# Patient Record
Sex: MALE | Employment: OTHER | URBAN - METROPOLITAN AREA
[De-identification: names, ages, dates, MRNs, and addresses within clinical notes are randomized per-mention and may not be internally consistent; named-entity substitution may affect disease eponyms.]

---

## 2017-01-10 ENCOUNTER — ALLSCRIPTS OFFICE VISIT (OUTPATIENT)
Dept: OTHER | Facility: OTHER | Age: 62
End: 2017-01-10

## 2017-01-31 ENCOUNTER — ALLSCRIPTS OFFICE VISIT (OUTPATIENT)
Dept: OTHER | Facility: OTHER | Age: 62
End: 2017-01-31

## 2017-01-31 LAB
FLUAV AG SPEC QL IA: POSITIVE
INFLUENZA B AG (HISTORICAL): NEGATIVE

## 2017-02-08 ENCOUNTER — ALLSCRIPTS OFFICE VISIT (OUTPATIENT)
Dept: OTHER | Facility: OTHER | Age: 62
End: 2017-02-08

## 2017-02-16 ENCOUNTER — GENERIC CONVERSION - ENCOUNTER (OUTPATIENT)
Dept: OTHER | Facility: OTHER | Age: 62
End: 2017-02-16

## 2017-05-11 ENCOUNTER — GENERIC CONVERSION - ENCOUNTER (OUTPATIENT)
Dept: OTHER | Facility: OTHER | Age: 62
End: 2017-05-11

## 2017-11-21 ENCOUNTER — GENERIC CONVERSION - ENCOUNTER (OUTPATIENT)
Dept: OTHER | Facility: OTHER | Age: 62
End: 2017-11-21

## 2018-01-09 ENCOUNTER — ALLSCRIPTS OFFICE VISIT (OUTPATIENT)
Dept: OTHER | Facility: OTHER | Age: 63
End: 2018-01-09

## 2018-01-09 DIAGNOSIS — E11.9 TYPE 2 DIABETES MELLITUS WITHOUT COMPLICATIONS (HCC): ICD-10-CM

## 2018-01-09 DIAGNOSIS — Z00.00 ENCOUNTER FOR GENERAL ADULT MEDICAL EXAMINATION WITHOUT ABNORMAL FINDINGS: ICD-10-CM

## 2018-01-09 DIAGNOSIS — I10 ESSENTIAL (PRIMARY) HYPERTENSION: ICD-10-CM

## 2018-01-09 DIAGNOSIS — Z12.5 ENCOUNTER FOR SCREENING FOR MALIGNANT NEOPLASM OF PROSTATE: ICD-10-CM

## 2018-01-12 NOTE — RESULT NOTES
Verified Results  (1) HEMOGLOBIN A1C [id number] 07:11AM Jerry Nuon   5 7-6 4% impaired fasting glucose  >=6 5% diagnosis of diabetes    Falsely low levels are seen in conditions linked to short RBC life span-  hemolytic anemia, and splenomegaly  Falsely elevated levels are seen in situations where there is an increased production of RBC- receipt of erythropoietin or blood transfusions  Adopted from ADA-Clinical Practice Recommendations     Test Name Result Flag Reference   HEMOGLOBIN A1C 10 6 % H 4 0-5 6   EST  AVG  GLUCOSE 258 mg/dl       (1) VITAMIN D 25-HYDROXY [id number] 07:11AM Jerry Nuno     Test Name Result Flag Reference   VIT D 25-HYDROX 28 3 ng/mL L 30 0-100 0     (1) LIPID PANEL FASTING W DIRECT LDL REFLEX [id number] 07:11AM Jerry Nuno   Triglyceride:         Normal              <150 mg/dl       Borderline High    150-199 mg/dl       High               200-499 mg/dl       Very High          >499 mg/dl  Cholesterol:         Desirable        <200 mg/dl      Borderline High  200-239 mg/dl      High             >239 mg/dl  HDL Cholesterol:        High    >59 mg/dL      Low     <41 mg/dL  LDL Cholesterol:        Optimal          <100 mg/dl         Near Optimal     100-129 mg/dl        Above Optimal          Borderline High   130-159 mg/dl          High              160-189 mg/dl          Very High        >189 mg/dl  LDL CALCULATED:    This screening LDL is a calculated result  It does not have the accuracy of the Direct Measured LDL in the monitoring of patients with hyperlipidemia and/or statin therapy  Direct Measure LDL (BSR985) must be ordered separately in these patients  Test Name Result Flag Reference   CHOLESTEROL 140 mg/dL     LDL CHOLESTEROL CALCULATED 73 mg/dL  0-100   TRIGLYCERIDES 102 mg/dL  <=150   Specimen collection should occur prior to N-Acetylcysteine or Metamizole administration due to the potential for falsely depressed results     HDL,DIRECT 47 mg/dL  40-60     (1) COMPREHENSIVE METABOLIC PANEL [id number] 64:76ZQ Griseldai, [address] [facility] Kidney Disease Education Program recommendations are as follows:  GFR calculation is accurate only with a steady state creatinine  Chronic Kidney disease less than 60 ml/min/1 73 sq  meters  Kidney failure less than 15 ml/min/1 73 sq  meters  Test Name Result Flag Reference   GLUCOSE,RANDM 171 mg/dL H    SODIUM 139 mmol/L  136-145   POTASSIUM 4 5 mmol/L  3 5-5 3   CHLORIDE 105 mmol/L  100-108   CARBON DIOXIDE 30 mmol/L  21-32   ANION GAP (CALC) 4 mmol/L  4-13   BLOOD UREA NITROGEN 16 mg/dL  5-25   CREATININE 0 88 mg/dL  0 60-1 30   Standardized to IDMS reference method   CALCIUM 8 8 mg/dL  8 3-10 1   BILI, TOTAL 0 54 mg/dL  0 20-1 00   ALK PHOSPHATAS 79 U/L     ALT (SGPT) 43 U/L  12-78   AST(SGOT) 18 U/L  5-45   ALBUMIN 2 9 g/dL L 3 5-5 0   TOTAL PROTEIN 7 4 g/dL  6 4-8 2   eGFR Non-African American      >60 0 ml/min/1 73sq m     (1) TSH WITH FT4 REFLEX [id number] 07:11AM Shyanne Mendoza   Patients undergoing fluorescein dye angiography may retain small amounts of fluorescein in the body for 48-72 hours post procedure  Samples containing fluorescein can produce falsely depressed TSH values  If the patient had this procedure,a specimen should be resubmitted post fluorescein clearance  Test Name Result Flag Reference   TSH 3 950 uIU/mL H 0 358-3 740     (1) TSH [id number] 07:11AM Daniele Hard   Patients undergoing fluorescein dye angiography may retain small amounts of fluorescein in the body for 48-72 hours post procedure  Samples containing fluorescein can produce falsely depressed TSH values  If the patient had this procedure,a specimen should be resubmitted post fluorescein clearance       Test Name Result Flag Reference   TSH 3 950 uIU/mL H 0 358-3 740     (1) TSH WITH FT4 REFLEX [id number] 07:11AM Daniele Hard     Test Name Result Flag Reference   T4,FREE 1 04 ng/dL  0 76-1 46     (1) MICROALBUMIN CREATININE RATIO, RANDOM URINE [id number] 07:11AM Daniele Hard     Test Name Result Flag Reference   MICROALBUMIN/ CREAT R 219 mg/g creatinine H 0-30   MICROALBUMIN,URINE 313 0 mg/L H 0 0-20 0   CREATININE URINE 143 0 mg/dL         Discussion/Summary   DIABETES STILL VERY OUT OF CONTROL  YOU NEED TO CONTACT YOUR ENDOCRINOLOGIST     DR IRIZARRY [facility]

## 2018-01-13 VITALS
WEIGHT: 280 LBS | BODY MASS INDEX: 40.18 KG/M2 | DIASTOLIC BLOOD PRESSURE: 90 MMHG | SYSTOLIC BLOOD PRESSURE: 114 MMHG | HEART RATE: 84 BPM | RESPIRATION RATE: 16 BRPM | TEMPERATURE: 99.2 F

## 2018-01-13 VITALS
BODY MASS INDEX: 40.23 KG/M2 | DIASTOLIC BLOOD PRESSURE: 78 MMHG | RESPIRATION RATE: 16 BRPM | HEART RATE: 68 BPM | TEMPERATURE: 98.6 F | SYSTOLIC BLOOD PRESSURE: 120 MMHG | WEIGHT: 280.38 LBS

## 2018-01-14 VITALS
SYSTOLIC BLOOD PRESSURE: 124 MMHG | DIASTOLIC BLOOD PRESSURE: 84 MMHG | HEART RATE: 68 BPM | OXYGEN SATURATION: 98 % | TEMPERATURE: 97.9 F | RESPIRATION RATE: 16 BRPM

## 2018-01-23 VITALS
SYSTOLIC BLOOD PRESSURE: 122 MMHG | HEART RATE: 73 BPM | DIASTOLIC BLOOD PRESSURE: 76 MMHG | RESPIRATION RATE: 18 BRPM | HEIGHT: 70 IN | TEMPERATURE: 98.1 F | WEIGHT: 288 LBS | BODY MASS INDEX: 41.23 KG/M2

## 2018-01-23 NOTE — PROGRESS NOTES
Assessment    1  Encounter for preventive health examination (V70 0) (Z00 00)   2  Diabetes mellitus type 2, uncomplicated (307 57) (O51 1)   3  Hypertension (401 9) (I10)   4  History of Prostate cancer screening (V76 44) (Z12 5)   5  Prostate cancer screening (V76 44) (Z12 5)    Plan  Diabetes mellitus type 2, uncomplicated, Health Maintenance, Hypertension, Prostate  cancer screening    · (1) PSA (SCREEN) (Dx V76 44 Screen for Prostate Cancer); Status:Active; Requested  CFL:93HFD4576;    · (1) QUANTIFERON - TB GOLD; Status:Active; Requested KGI:97PSN7028;    · Follow-up visit in 1 year Evaluation and Treatment  Follow-up  Status: Complete  Done:  36FJO5972   · Pneumovax 23 25 MCG/0 5ML Injection Injectable; INJECT 0 5  ML  Intramuscular; To Be Done: [id number]  Need for pneumococcal vaccination    · Pneumovax 23 25 MCG/0 5ML Injection Injectable  Neurodermatitis    · Clobetasol Propionate 0 05 % External Ointment; APPLY SPARINGLY TO  AFFECTED AREA(S) ONCE DAILY    Discussion/Summary  health maintenance visit Impression: healthy adult male  Currently, he eats a healthy diet and has an adequate exercise regimen  Prostate cancer screening: the risks and benefits of prostate cancer screening were discussed, PSA was ordered and Pt declines VALERIANO / prostatic exam today  Colorectal cancer screening: the risks and benefits of colorectal cancer screening were discussed, colorectal cancer screening is current and colorectal cancer screening is managed by GI  Screening lab work includes PSA and Quantiferon Gold testing ordered  The risks and benefits of immunizations were discussed, immunizations are up to date and Pneumovax given IM today, and tolerated well  Advice and education were given regarding nutrition, aerobic exercise and Forms signed off for the pt today  He is to continue f/u with Endocrinology  Patient discussion: discussed with the patient, Laverne Mandujano is to f/u in 1 year, or sooner PRN        Chief Complaint  PT here for physical  No complaints or pain  Has paperwork that needs to be filled out  History of Present Illness  HM, Adult Male: The patient is being seen for a health maintenance evaluation  The last health maintenance visit was 1 year(s) ago  General Health: The patient's health since the last visit is described as good   Seeing his Endocrinologist regularly  Last A1c reported to be 11%  Back on his diet  He is walking for exercise , Recent trip [facility] -> feeling better  Eye exam is UTD  Colonoscopy is UTD  He has regular dental visits  He denies vision problems  He denies hearing loss  Immunizations status: up to date  Lifestyle:  He consumes a diverse and healthy diet  He has weight concerns  He exercises regularly  He does not use tobacco  The patient is a former cigarette smoker  He denies alcohol use  He denies drug use  Reproductive health:  the patient is sexually active  He complains of erectile dysfunction  Screening: cancer screening reviewed and current  metabolic screening reviewed and current  risk screening reviewed and current  Additional History:  No CP/SOB  No abd pain or rectal bleeding  Urine flow is fine  No ED  No anxiety or depression  HPI: As above  Review of Systems    Constitutional: as noted in HPI  Cardiovascular: as noted in HPI  Respiratory: as noted in HPI  Gastrointestinal: as noted in HPI  Genitourinary: as noted in HPI  Psychiatric: as noted in HPI  Endocrine: as noted in HPI  Active Problems    1  Acute bronchitis (466 0) (J20 9)   2  Chronic low back pain (724 2,338 29) (M54 5,G89 29)   3  Cough (786 2) (R05)   4  Diabetes mellitus type 2, uncomplicated (602 69) (C15 8)   5  Fever (780 60) (R50 9)   6  Hypertension (401 9) (I10)   7  Influenza A (487 1) (J10 1)   8  Low vitamin D level (268 9) (E55 9)   9  Mitral valve disorder (424 0) (I05 9)   10   Morbid or severe obesity due to excess calories (278 01) (E66 01) 11  Neck pain (723 1) (M54 2)   12  Need for prophylactic vaccination and inoculation against influenza (V04 81) (Z23)   13  History of Need for prophylactic vaccination and inoculation against influenza (V04 81)    (Z23)   14  Neurodermatitis (698 3) (L28 0)   15  Nocturia (788 43) (R35 1)   16  Organic impotence (607 84) (N52 9)   17  Screening for colorectal cancer (V76 51) (Z12 11,Z12 12)   18  Well adult on routine health check (V70 0) (Z00 00)    Past Medical History    · History of Groin (Inguinal) Pain (789 09)   · History of Need for prophylactic vaccination and inoculation against influenza (V04 81)  (Z23)   · History of Prostate cancer screening (V76 44) (Z12 5)    Surgical History    · History Of Prior Surgery    Family History  Mother    · Family history of diabetes mellitus (V18 0) (Z83 3)   · Family history of hypertension (V17 49) (Z82 49)  Father    · Family history of diabetes mellitus (V18 0) (Z83 3)   · Family history of hypertension (V17 49) (Z82 49)    Social History    · Former smoker (V15 82) (Q53 103)   · Social alcohol use (Z78 9)    Current Meds   1  Accu-Chek Celine Plus In Vitro Strip; TEST 3 TIMES DAILY; Therapy: 94LHB6833 to (Evaluate:21Jan2018)  Requested for: 98ELH6920; Last   Rx:81Atj2045 Ordered   2  Accu-Chek Celine Plus w/Device Kit; USE AS DIRECTED; Therapy: 83KLO6754 to (Last Rx:10Sga5172)  Requested for: 85UVI2210 Ordered   3  Ammonium Lactate 12 % External Lotion; Therapy: 89XQV5865 to Recorded   4  Apidra SoloStar 100 UNIT/ML Subcutaneous Solution Pen-injector; Therapy: 15WJI7493 to Recorded   5  Azithromycin 250 MG Oral Tablet; TAKE 2 TABLETS ON DAY 1 THEN TAKE 1 TABLET A   DAY FOR 4 DAYS; Therapy: 39FDW6946 to (Evaluate:15Jan2017)  Requested for: 88LNP1888; Last   Rx:10Jan2017 Ordered   6  BD Pen Needle Nafisa U/F 32G X 4 MM Miscellaneous; Therapy: 53RPE2191 to Recorded   7  Cialis 20 MG Oral Tablet; Take as directed;    Therapy: 15Apr2015 to (Evaluate:97Rrb3511) Requested for: 09LGR1025; Last   Rx:71Iye4248 Ordered   8  Clobetasol Propionate 0 05 % External Ointment; Therapy: 08WLB5990 to (Evaluate:29Apr2015) Recorded   9  Clotrimazole 1 % External Cream;   Therapy: 48WDP4034 to (Evaluate:25Apr2015) Recorded   10  FreeStyle Freedom Lite w/Device Kit; USE AS DIRECTED; Therapy: 08REC7210 to (Last Rx:01Jbb3978) Ordered   11  FreeStyle Lancets Miscellaneous; USE TO TEST BLOOD SUGAR TWICE DAILY; Therapy: 65Xwm7112 to (Ani Diesel)  Requested for: 75ROU4866; Last    Rx:06Lmm3377 Ordered   12  FreeStyle Lite Test In Vitro Strip; USE TO TEST BLOOD SUGAR TWICE DAILY; Therapy: 39PBF4298 to (Evaluate:25Mar2017)  Requested for: 06QZD4438; Last    Rx:70Vyu9474 Ordered   13  Insulin Syringe 30G X 5/16" 1 ML Miscellaneous; Therapy: 23XQG2460 to (Last Rx:64Rwi9147)  Requested for: 74Dzf4984 Ordered   14  Januvia 100 MG Oral Tablet Recorded   15  Lantus 100 UNIT/ML Subcutaneous Solution; Therapy: 39TKG9237 to (Last Rx:34Gtc5736)  Requested for: 63FWL5364 Ordered   16  Lantus SoloStar 100 UNIT/ML Subcutaneous Solution Pen-injector; Therapy: 11JZY2866 to Recorded   17  Levitra 20 MG Oral Tablet; TAKE ONE TABLET BY MOUTH AS NEEDED; Therapy: 09XPA4895 to (Evaluate:09Sfm5984)  Requested for: 94UML0236; Last    Rx:27Slf0542 Ordered   25  Losartan Potassium 50 MG Oral Tablet; take one tablet by mouth daily as directed; Therapy: 86KSA0360 to (Evaluate:05Jan2018)  Requested for: 23Lsd0790; Last    Rx:57Eti8727 Ordered   19  Lovastatin 40 MG Oral Tablet; Therapy: 56OVX0476 to Recorded   20  NovoFine 32G X 6 MM Miscellaneous; USE AS DIRECTED; Therapy: 72YIO2061 to (Last Rx:59Jhp1710)  Requested for: 57Sud0120 Ordered   21  Nystatin 451231 UNIT/GM External Cream;    Therapy: 49LMN1280 to (Evaluate:22Apr2015) Recorded   22  Qvar 80 MCG/ACT Inhalation Aerosol Solution; INHALE 1 PUFF BY MOUTH TWO TIMES    DAILY; Therapy: 92NRJ4832 to (Evaluate:27Nov2017);  Last AM:91GFS4793 Ordered   23  Ventolin  (90 Base) MCG/ACT Inhalation Aerosol Solution; INHALE 1 TO 2    PUFFS EVERY 4 TO 6 HOURS AS NEEDED; Therapy: 30PRA2366 to (Last Rx:10Jan2017)  Requested for: 08SCQ4895 Ordered   24  Vitamin D (Ergocalciferol) 49939 UNIT Oral Capsule; Therapy: 68YUN6022 to Recorded    Allergies    1  No Known Drug Allergies    2  Dust    Vitals   Recorded: 15NAL9568 02:42PM   Temperature 98 1 F   Heart Rate 73   Respiration 18   Systolic 829   Diastolic 76   Height 5 ft 10 31 in   Weight 288 lb    BMI Calculated 40 96   BSA Calculated 2 45   Pain Scale 0     Physical Exam    Constitutional   General appearance: No acute distress, well appearing and well nourished  NAD; VSS; pleasant  Head and Face   Head and face: Normal     Eyes   Conjunctiva and lids: No erythema, swelling or discharge  Ears, Nose, Mouth, and Throat   External inspection of ears and nose: Normal     Otoscopic examination: Tympanic membranes translucent with normal light reflex  Canals patent without erythema  Hearing: Normal     Lips, teeth, and gums: Normal, good dentition  Oropharynx: Normal with no erythema, edema, exudate or lesions  Neck   Neck: Supple, symmetric, trachea midline, no masses  Pulmonary   Respiratory effort: No increased work of breathing or signs of respiratory distress  Auscultation of lungs: Clear to auscultation  Cardiovascular   Auscultation of heart: Normal rate and rhythm, normal S1 and S2, no murmurs  Abdomen   Abdomen: Non-tender, no masses  Liver and spleen: No hepatomegaly or splenomegaly  Lymphatic   Palpation of lymph nodes in neck: No lymphadenopathy  Musculoskeletal   Gait and station: Normal     Skin   Skin and subcutaneous tissue: Normal without rashes or lesions  Pt with chronic eczema type dermatitis to the LLE - no signs of secondary infection     Psychiatric   Judgment and insight: Normal     Orientation to person, place and time: Normal  Recent and remote memory: Intact      Mood and affect: Normal        Signatures   Electronically signed by : Pankaj Seay DO; River 10 2018  7:16AM EST                       (Author)

## 2018-03-16 DIAGNOSIS — E55.9 VITAMIN D DEFICIENCY: Primary | ICD-10-CM

## 2018-03-19 LAB
M TB IFN-G CD4+ BCKGRND COR BLD-ACNC: <0 IU/ML
M TB IFN-G CD4+ T-CELLS BLD-ACNC: 0.07 IU/ML
M TB TUBERC IFN-G BLD QL: NEGATIVE
M TB TUBERC IGNF/MITOGEN IGNF CONTROL: >10 IU/ML

## 2018-03-19 NOTE — PROGRESS NOTES
Please let the patient know that their bloodwork was normal - TB testing was negative  Thanks     Dr Elsy Sarmiento

## 2018-03-23 ENCOUNTER — TELEPHONE (OUTPATIENT)
Dept: FAMILY MEDICINE CLINIC | Facility: CLINIC | Age: 63
End: 2018-03-23

## 2018-04-30 ENCOUNTER — TELEPHONE (OUTPATIENT)
Dept: FAMILY MEDICINE CLINIC | Facility: CLINIC | Age: 63
End: 2018-04-30

## 2018-04-30 NOTE — TELEPHONE ENCOUNTER
Spoke to wife  She already received TB results via mailed letter  Pt has yet to get other labs done for them to be finalized

## 2018-04-30 NOTE — TELEPHONE ENCOUNTER
Ok to print out for him - we already last month let him know that his Quantiferon Gold Test was negative  There are no other labs  Thanks    Josette (That was all that was requested)

## 2018-05-08 ENCOUNTER — OFFICE VISIT (OUTPATIENT)
Dept: FAMILY MEDICINE CLINIC | Facility: CLINIC | Age: 63
End: 2018-05-08
Payer: COMMERCIAL

## 2018-05-08 VITALS
SYSTOLIC BLOOD PRESSURE: 134 MMHG | DIASTOLIC BLOOD PRESSURE: 82 MMHG | RESPIRATION RATE: 14 BRPM | WEIGHT: 292 LBS | HEART RATE: 84 BPM

## 2018-05-08 DIAGNOSIS — S91.312A LACERATION OF LEFT FOOT, INITIAL ENCOUNTER: Primary | ICD-10-CM

## 2018-05-08 DIAGNOSIS — J45.20 MILD INTERMITTENT ASTHMA WITHOUT COMPLICATION: ICD-10-CM

## 2018-05-08 PROCEDURE — 99213 OFFICE O/P EST LOW 20 MIN: CPT | Performed by: NURSE PRACTITIONER

## 2018-05-08 PROCEDURE — 90471 IMMUNIZATION ADMIN: CPT | Performed by: NURSE PRACTITIONER

## 2018-05-08 PROCEDURE — 90472 IMMUNIZATION ADMIN EACH ADD: CPT

## 2018-05-08 PROCEDURE — 3725F SCREEN DEPRESSION PERFORMED: CPT | Performed by: NURSE PRACTITIONER

## 2018-05-08 PROCEDURE — 90715 TDAP VACCINE 7 YRS/> IM: CPT | Performed by: NURSE PRACTITIONER

## 2018-05-08 RX ORDER — ALBUTEROL SULFATE 90 UG/1
2 AEROSOL, METERED RESPIRATORY (INHALATION) EVERY 4 HOURS PRN
Qty: 1 INHALER | Refills: 2 | Status: SHIPPED | OUTPATIENT
Start: 2018-05-08 | End: 2018-09-19 | Stop reason: SDUPTHER

## 2018-05-09 NOTE — PROGRESS NOTES
Assessment/Plan:           Problem List Items Addressed This Visit     None      Visit Diagnoses     Laceration of left foot, initial encounter    -  Primary    Relevant Medications    albuterol (PROVENTIL HFA,VENTOLIN HFA) 90 mcg/act inhaler    mupirocin (BACTROBAN) 2 % ointment    Other Relevant Orders    TDAP VACCINE GREATER THAN OR EQUAL TO 6YO IM (Completed)    Mild intermittent asthma without complication        Relevant Medications    albuterol (PROVENTIL HFA,VENTOLIN HFA) 90 mcg/act inhaler            Subjective:      Patient ID: Skylar Warren is a 63 y o  male  Here for c/o pain of both feet  Was using tools to heels for dry skin and developed laceration to heel  Painful and open  Overdue for td vaccine, unknown last time received  Also having asthma flare due to allergy season, does not have albuterol rescue inhaler         The following portions of the patient's history were reviewed and updated as appropriate: allergies, current medications, past family history, past medical history, past social history, past surgical history and problem list     Review of Systems   Constitutional: Negative for activity change, appetite change and fatigue  Respiratory: Positive for wheezing  Negative for cough and shortness of breath  Cardiovascular: Negative for chest pain and palpitations  Skin: Positive for wound  Objective:      /82   Pulse 84   Resp 14   Wt 132 kg (292 lb)     Family History   Problem Relation Age of Onset    Diabetes Mother     Colon cancer Father     Diabetes Sister     Diabetes Brother      Past Medical History:   Diagnosis Date    Asthma     Diabetes ([address] )     Hypertension      Social History     Social History    Marital status: Unknown     Spouse name: N/A    Number of children: N/A    Years of education: N/A     Occupational History    Not on file       Social History Main Topics    Smoking status: Never Smoker    Smokeless tobacco: Never Used   [facility] Alcohol use No    Drug use: Unknown    Sexual activity: Not on file     Other Topics Concern    Not on file     Social History Narrative    No narrative on file       Current Outpatient Prescriptions:     albuterol (PROVENTIL HFA,VENTOLIN HFA) 90 mcg/act inhaler, Inhale 2 puffs every 4 (four) hours as needed for wheezing, Disp: 1 Inhaler, Rfl: 2    mupirocin (BACTROBAN) 2 % ointment, Apply topically 3 (three) times a day, Disp: 22 g, Rfl: 0  Allergies not on file     Physical Exam   Constitutional: He is oriented to person, place, and time  He appears well-developed and well-nourished  HENT:   Head: Normocephalic and atraumatic  Right Ear: External ear normal    Left Ear: External ear normal    Nose: Nose normal    Mouth/Throat: Oropharynx is clear and moist    Eyes: Conjunctivae are normal    Neck: Normal range of motion  Neck supple  Cardiovascular: Normal rate, regular rhythm and normal heart sounds  Pulmonary/Chest: Effort normal and breath sounds normal    Musculoskeletal: Normal range of motion  Neurological: He is alert and oriented to person, place, and time  Skin: Skin is warm  Laceration noted  Psychiatric: He has a normal mood and affect  His behavior is normal  Judgment and thought content normal    Nursing note and vitals reviewed

## 2018-06-07 ENCOUNTER — TELEPHONE (OUTPATIENT)
Dept: FAMILY MEDICINE CLINIC | Facility: CLINIC | Age: 63
End: 2018-06-07

## 2018-06-07 DIAGNOSIS — J45.20 MILD INTERMITTENT ASTHMA WITHOUT COMPLICATION: Primary | ICD-10-CM

## 2018-09-19 DIAGNOSIS — S91.312A LACERATION OF LEFT FOOT, INITIAL ENCOUNTER: ICD-10-CM

## 2018-09-24 LAB
LEFT EYE DIABETIC RETINOPATHY: NORMAL
RIGHT EYE DIABETIC RETINOPATHY: NORMAL

## 2018-10-24 ENCOUNTER — OFFICE VISIT (OUTPATIENT)
Dept: FAMILY MEDICINE CLINIC | Facility: CLINIC | Age: 63
End: 2018-10-24

## 2018-10-24 VITALS
TEMPERATURE: 97.8 F | OXYGEN SATURATION: 98 % | BODY MASS INDEX: 41.35 KG/M2 | HEIGHT: 70 IN | RESPIRATION RATE: 18 BRPM | HEART RATE: 75 BPM | DIASTOLIC BLOOD PRESSURE: 82 MMHG | SYSTOLIC BLOOD PRESSURE: 126 MMHG | WEIGHT: 288.8 LBS

## 2018-10-24 DIAGNOSIS — J45.20 MILD INTERMITTENT ASTHMA WITHOUT COMPLICATION: Chronic | ICD-10-CM

## 2018-10-24 DIAGNOSIS — J01.01 ACUTE RECURRENT MAXILLARY SINUSITIS: Primary | ICD-10-CM

## 2018-10-24 PROCEDURE — 3008F BODY MASS INDEX DOCD: CPT | Performed by: FAMILY MEDICINE

## 2018-10-24 PROCEDURE — 99213 OFFICE O/P EST LOW 20 MIN: CPT | Performed by: FAMILY MEDICINE

## 2018-10-24 RX ORDER — ALBUTEROL SULFATE 90 UG/1
2 AEROSOL, METERED RESPIRATORY (INHALATION)
COMMUNITY
Start: 2017-01-10 | End: 2019-04-09 | Stop reason: SDUPTHER

## 2018-10-24 RX ORDER — LOSARTAN POTASSIUM 50 MG/1
50 TABLET ORAL
COMMUNITY
Start: 2015-12-24

## 2018-10-24 RX ORDER — AMOXICILLIN AND CLAVULANATE POTASSIUM 875; 125 MG/1; MG/1
1 TABLET, FILM COATED ORAL 2 TIMES DAILY WITH MEALS
Qty: 20 TABLET | Refills: 0 | Status: SHIPPED | OUTPATIENT
Start: 2018-10-24 | End: 2018-11-03

## 2018-10-24 RX ORDER — ALBUTEROL SULFATE 90 UG/1
2 AEROSOL, METERED RESPIRATORY (INHALATION) EVERY 6 HOURS PRN
Qty: 18 G | Refills: 5 | Status: SHIPPED | OUTPATIENT
Start: 2018-10-24 | End: 2019-05-27 | Stop reason: SDUPTHER

## 2018-10-24 RX ORDER — TADALAFIL 20 MG/1
TABLET ORAL
COMMUNITY
Start: 2015-04-15 | End: 2019-09-26

## 2018-10-24 RX ORDER — ERGOCALCIFEROL 1.25 MG/1
CAPSULE ORAL
COMMUNITY
Start: 2014-02-14 | End: 2019-09-26

## 2018-10-24 RX ORDER — CLOBETASOL PROPIONATE 0.5 MG/G
OINTMENT TOPICAL DAILY
COMMUNITY
Start: 2014-11-25 | End: 2019-02-06 | Stop reason: SDUPTHER

## 2018-10-24 RX ORDER — IBUPROFEN 600 MG/1
600 TABLET ORAL EVERY 8 HOURS
COMMUNITY
Start: 2017-12-29

## 2018-10-24 RX ORDER — INSULIN DEGLUDEC 200 U/ML
60 INJECTION, SOLUTION SUBCUTANEOUS
Refills: 6 | COMMUNITY
Start: 2018-09-14

## 2018-10-24 RX ORDER — ASPIRIN 81 MG/1
81 TABLET ORAL
COMMUNITY
Start: 2014-03-17

## 2018-10-24 RX ORDER — BLOOD-GLUCOSE METER
EACH MISCELLANEOUS
COMMUNITY
Start: 2017-12-18

## 2018-10-24 NOTE — PROGRESS NOTES
Assessment/Plan:    No problem-specific Assessment & Plan notes found for this encounter  Diagnoses and all orders for this visit:    Acute recurrent maxillary sinusitis  Comments:  Adriel Suggs is stable on exam  Treating with with Augmentin, OTC Cough/Cold Preps PRN (Coricidin HBP), Albuterol PRN, rest, and good PO hydration  Orders:  -     amoxicillin-clavulanate (AUGMENTIN) 875-125 mg per tablet; Take 1 tablet by mouth 2 (two) times a day with meals for 10 days    Mild intermittent asthma without complication  Comments:  Stable at this time  A PRN Albuterol HFA was ordered for him to keep on hand  Orders:  -     albuterol (VENTOLIN HFA) 90 mcg/act inhaler; Inhale 2 puffs every 6 (six) hours as needed for wheezing or shortness of breath    Other orders  -     Blood Glucose Monitoring Suppl (ACCU-CHEK YADY PLUS) w/Device KIT; by Does not apply route  -     glucose blood test strip; by In Vitro route 3 (three) times a day  -     aspirin (ECOTRIN LOW STRENGTH) 81 mg EC tablet; Take 81 mg by mouth  -     TRESIBA FLEXTOUCH 200 units/mL CONCENTRATED U-200 injection pen; INJEST 54 UNITS SC D  -     losartan (COZAAR) 50 mg tablet; Take 50 mg by mouth  -     ergocalciferol (VITAMIN D2) 50,000 units; Take by mouth  -     albuterol (VENTOLIN HFA) 90 mcg/act inhaler; Inhale 2 puffs  -     tadalafil (CIALIS) 20 MG tablet; Take by mouth  -     ibuprofen (MOTRIN) 600 mg tablet; Take 600 mg by mouth every 8 (eight) hours  -     clobetasol (TEMOVATE) 0 05 % ointment; Apply topically daily          Subjective:      Patient ID: Erik Brink is a 63 y o  male  Adriel Snowdenr has been achey, coughing, congested, etc, over the last month  Presents today with his daughter  Does not have PRN Albuterol at home            The following portions of the patient's history were reviewed and updated as appropriate: allergies, current medications, past family history, past social history, past surgical history and problem list     Past Medical History:   Diagnosis Date    Asthma     Diabetes ([facility] [address] )     Hypertension        Current Outpatient Prescriptions:     albuterol (VENTOLIN HFA) 90 mcg/act inhaler, Inhale 2 puffs, Disp: , Rfl:     aspirin (ECOTRIN LOW STRENGTH) 81 mg EC tablet, Take 81 mg by mouth, Disp: , Rfl:     Blood Glucose Monitoring Suppl (ACCU-CHEK YADY PLUS) w/Device KIT, by Does not apply route, Disp: , Rfl:     clobetasol (TEMOVATE) 0 05 % ointment, Apply topically daily, Disp: , Rfl:     ergocalciferol (VITAMIN D2) 50,000 units, Take by mouth, Disp: , Rfl:     glucose blood test strip, by In Vitro route 3 (three) times a day, Disp: , Rfl:     ibuprofen (MOTRIN) 600 mg tablet, Take 600 mg by mouth every 8 (eight) hours, Disp: , Rfl:     losartan (COZAAR) 50 mg tablet, Take 50 mg by mouth, Disp: , Rfl:     tadalafil (CIALIS) 20 MG tablet, Take by mouth, Disp: , Rfl:     albuterol (VENTOLIN HFA) 90 mcg/act inhaler, Inhale 2 puffs every 6 (six) hours as needed for wheezing or shortness of breath, Disp: 18 g, Rfl: 5    amoxicillin-clavulanate (AUGMENTIN) 875-125 mg per tablet, Take 1 tablet by mouth 2 (two) times a day with meals for 10 days, Disp: 20 tablet, Rfl: 0    mometasone-formoterol (DULERA) 100-5 MCG/ACT inhaler, Inhale 2 puffs 2 (two) times a day Rinse mouth after use , Disp: 1 Inhaler, Rfl: 5    mupirocin (BACTROBAN) 2 % ointment, Apply topically 3 (three) times a day, Disp: 22 g, Rfl: 0    TRESIBA FLEXTOUCH 200 units/mL CONCENTRATED U-200 injection pen, INJEST 54 UNITS SC D, Disp: , Rfl: 6    Allergies   Allergen Reactions    Dust Mite Extract    NKDA    Review of Systems   Constitutional: Negative for activity change and fever  HENT: Positive for congestion, rhinorrhea and sinus pressure  Respiratory: Positive for cough and wheezing            Objective:      /82 (BP Location: Right arm, Patient Position: Sitting, Cuff Size: Large)   Pulse 75   Temp 97 8 °F (36 6 °C) (Tympanic)   Resp 18  5' 10" (1 778 m)   Wt 131 kg (288 lb 12 8 oz)   SpO2 98%   BMI 41 44 kg/m²          Physical Exam   Constitutional: He is oriented to person, place, and time  He appears well-developed and well-nourished  No distress  HENT:   Head: Normocephalic and atraumatic  Right Ear: Hearing, tympanic membrane, external ear and ear canal normal    Left Ear: Hearing, tympanic membrane, external ear and ear canal normal    Nose: Mucosal edema present  Right sinus exhibits maxillary sinus tenderness  Left sinus exhibits maxillary sinus tenderness  Mouth/Throat: Oropharynx is clear and moist  No oropharyngeal exudate  Eyes: Conjunctivae are normal    Neck: Normal range of motion  Neck supple  No thyromegaly present  Cardiovascular: Normal rate, regular rhythm and normal heart sounds  Exam reveals no gallop and no friction rub  No murmur heard  Pulmonary/Chest: Effort normal and breath sounds normal  No respiratory distress  He has no wheezes  He has no rales  Lymphadenopathy:     He has no cervical adenopathy  Neurological: He is alert and oriented to person, place, and time  Skin: He is not diaphoretic  Psychiatric: He has a normal mood and affect  His behavior is normal  Judgment and thought content normal    Nursing note and vitals reviewed

## 2018-12-13 PROBLEM — Z79.4 DIABETES MELLITUS TYPE 2, INSULIN DEPENDENT (HCC): Status: ACTIVE | Noted: 2018-12-13

## 2018-12-13 PROBLEM — E11.9 DIABETES MELLITUS TYPE 2, INSULIN DEPENDENT (HCC): Status: ACTIVE | Noted: 2018-12-13

## 2018-12-13 PROBLEM — E66.01 MORBID OBESITY WITH BODY MASS INDEX OF 40.0-44.9 IN ADULT (HCC): Status: ACTIVE | Noted: 2018-12-13

## 2018-12-26 ENCOUNTER — OFFICE VISIT (OUTPATIENT)
Dept: FAMILY MEDICINE CLINIC | Facility: CLINIC | Age: 63
End: 2018-12-26
Payer: COMMERCIAL

## 2018-12-26 VITALS
SYSTOLIC BLOOD PRESSURE: 126 MMHG | WEIGHT: 295 LBS | BODY MASS INDEX: 42.23 KG/M2 | DIASTOLIC BLOOD PRESSURE: 82 MMHG | RESPIRATION RATE: 16 BRPM | HEIGHT: 70 IN

## 2018-12-26 DIAGNOSIS — H25.013 CORTICAL AGE-RELATED CATARACT OF BOTH EYES: Primary | Chronic | ICD-10-CM

## 2018-12-26 DIAGNOSIS — E11.9 DIABETES MELLITUS TYPE 2, INSULIN DEPENDENT (HCC): Chronic | ICD-10-CM

## 2018-12-26 DIAGNOSIS — Z79.4 DIABETES MELLITUS TYPE 2, INSULIN DEPENDENT (HCC): Chronic | ICD-10-CM

## 2018-12-26 DIAGNOSIS — Z23 NEED FOR VACCINATION: ICD-10-CM

## 2018-12-26 DIAGNOSIS — Z12.5 SCREENING FOR PROSTATE CANCER: ICD-10-CM

## 2018-12-26 DIAGNOSIS — R53.83 FATIGUE, UNSPECIFIED TYPE: ICD-10-CM

## 2018-12-26 PROCEDURE — 99243 OFF/OP CNSLTJ NEW/EST LOW 30: CPT | Performed by: FAMILY MEDICINE

## 2018-12-26 PROCEDURE — 93000 ELECTROCARDIOGRAM COMPLETE: CPT | Performed by: FAMILY MEDICINE

## 2018-12-26 PROCEDURE — 90682 RIV4 VACC RECOMBINANT DNA IM: CPT

## 2018-12-26 PROCEDURE — 90471 IMMUNIZATION ADMIN: CPT

## 2018-12-26 RX ORDER — PEN NEEDLE, DIABETIC 32GX 5/32"
NEEDLE, DISPOSABLE MISCELLANEOUS
Refills: 1 | COMMUNITY
Start: 2018-12-21

## 2018-12-26 RX ORDER — POLYMYXIN B SULFATE AND TRIMETHOPRIM 1; 10000 MG/ML; [USP'U]/ML
SOLUTION OPHTHALMIC
Refills: 1 | COMMUNITY
Start: 2018-12-12 | End: 2019-09-26

## 2018-12-26 RX ORDER — KETOROLAC TROMETHAMINE 5 MG/ML
SOLUTION OPHTHALMIC
Refills: 2 | COMMUNITY
Start: 2018-12-12 | End: 2019-09-26

## 2018-12-26 RX ORDER — INSULIN LISPRO 100 U/ML
20 INJECTION, SOLUTION SUBCUTANEOUS
Refills: 1 | COMMUNITY
Start: 2018-12-20

## 2018-12-26 RX ORDER — PREDNISOLONE ACETATE 10 MG/ML
SUSPENSION/ DROPS OPHTHALMIC
Refills: 2 | COMMUNITY
Start: 2018-12-12 | End: 2019-09-26

## 2018-12-26 NOTE — PROGRESS NOTES
Assessment/Plan:    No problem-specific Assessment & Plan notes found for this encounter  Diagnoses and all orders for this visit:    Cortical age-related cataract of both eyes  Comments:  Jared Bland is cleared medically for his procedures at this time  We will send word to his Opthalmologist, once pt gets us this info  Orders:  -     POCT ECG    Need for vaccination  Comments:  Flu Vaccine given IM today, and tolerated well  Orders:  -     PREFERRED: influenza vaccine, [id number], quadrivalent, recombinant, PF, 0 5 mL, for patients 18 yr+ (FLUBLOK)    Diabetes mellitus type 2, insulin dependent ([facility] [address] )  Comments:  Pt continues f/u with [facility]  FBW was ordered for the pt  Orders:  -     HEMOGLOBIN A1C W/ EAG ESTIMATION; Future  -     Lipid Panel with Direct LDL reflex; Future  -     Comprehensive metabolic panel; Future    Fatigue, unspecified type  -     TSH, 3rd generation with Free T4 reflex; Future  -     CBC and differential; Future    Screening for prostate cancer  -     PSA, Total Screen; Future    Other orders  -     ADMELOG SOLOSTAR 100 units/mL injection pen; INJECT 40 UNITS SQ TID WC  -     BD PEN NEEDLE FRANCISCO U/F 32G X 4 MM MISC; USE QID  -     ketorolac (ACULAR) 0 5 % ophthalmic solution; INSTILL 1 DROP INTO SURGERY EYE QID START 3 DAYS PRIOR TO SURGERY DATE SEPARATE EYE DROPS BY 5 MINUTES OF EACH OTHER  -     polymyxin b-trimethoprim (POLYTRIM) ophthalmic solution; INT 1 GTT INTO SURGERY EYE QID  START 3 DAYS PRIOR TO SURGERY DATE  SEPARATE SURGERY EYE DROPS BY 5 MINUTES OF EACH OTHER  -     prednisoLONE acetate (PRED FORTE) 1 % ophthalmic suspension; INSTILL 1 DROP INTO SURGERY EYE QID  START 3 DAYS PRIOR TO SURGERY DATE  SEPARATE EYE DROPS BY 5 MINUTES OF EACH OTHER  Subjective:      Patient ID: Jose Clements is a 66 y o  male  Continues with Endocrinology at [address]  Had colonoscopy 5 - 6 years ago (hx polyps)  Has dentures     Has cataract surgery scheduled 1/7/19 (right); then the second surgery in 2 weeks  He does not recall the name of his Ophthalmologist -> I asked him to call back later today with this info, so we could send word to his specialist     ECG done today was reassuring                The following portions of the patient's history were reviewed and updated as appropriate: allergies, current medications, past family history, past social history, past surgical history and problem list     Past Medical History:   Diagnosis Date    Asthma     Diabetes ([facility] [address] )     Hypertension        Current Outpatient Prescriptions:     ADMELOG SOLOSTAR 100 units/mL injection pen, INJECT 40 UNITS SQ TID WC, Disp: , Rfl: 1    albuterol (VENTOLIN HFA) 90 mcg/act inhaler, Inhale 2 puffs, Disp: , Rfl:     albuterol (VENTOLIN HFA) 90 mcg/act inhaler, Inhale 2 puffs every 6 (six) hours as needed for wheezing or shortness of breath, Disp: 18 g, Rfl: 5    aspirin (ECOTRIN LOW STRENGTH) 81 mg EC tablet, Take 81 mg by mouth, Disp: , Rfl:     BD PEN NEEDLE FRANCISCO U/F 32G X 4 MM MISC, USE QID, Disp: , Rfl: 1    Blood Glucose Monitoring Suppl (ACCU-CHEK YADY PLUS) w/Device KIT, by Does not apply route, Disp: , Rfl:     clobetasol (TEMOVATE) 0 05 % ointment, Apply topically daily, Disp: , Rfl:     ergocalciferol (VITAMIN D2) 50,000 units, Take by mouth, Disp: , Rfl:     glucose blood test strip, by In Vitro route 3 (three) times a day, Disp: , Rfl:     ibuprofen (MOTRIN) 600 mg tablet, Take 600 mg by mouth every 8 (eight) hours, Disp: , Rfl:     ketorolac (ACULAR) 0 5 % ophthalmic solution, INSTILL 1 DROP INTO SURGERY EYE QID START 3 DAYS PRIOR TO SURGERY DATE SEPARATE EYE DROPS BY 5 MINUTES OF EACH OTHER, Disp: , Rfl: 2    losartan (COZAAR) 50 mg tablet, Take 50 mg by mouth, Disp: , Rfl:     mometasone-formoterol (DULERA) 100-5 MCG/ACT inhaler, Inhale 2 puffs 2 (two) times a day Rinse mouth after use , Disp: 1 Inhaler, Rfl: 5    mupirocin (BACTROBAN) 2 % ointment, Apply topically 3 (three) times a day, Disp: 22 g, Rfl: 0    polymyxin b-trimethoprim (POLYTRIM) ophthalmic solution, INT 1 GTT INTO SURGERY EYE QID  START 3 DAYS PRIOR TO SURGERY DATE  SEPARATE SURGERY EYE DROPS BY 5 MINUTES OF EACH OTHER, Disp: , Rfl: 1    prednisoLONE acetate (PRED FORTE) 1 % ophthalmic suspension, INSTILL 1 DROP INTO SURGERY EYE QID  START 3 DAYS PRIOR TO SURGERY DATE  SEPARATE EYE DROPS BY 5 MINUTES OF EACH OTHER , Disp: , Rfl: 2    tadalafil (CIALIS) 20 MG tablet, Take by mouth, Disp: , Rfl:     TRESIBA FLEXTOUCH 200 units/mL CONCENTRATED U-200 injection pen, INJEST 54 UNITS SC D, Disp: , Rfl: 6    Allergies   Allergen Reactions    Dust Mite Extract      Social History   Substance Use Topics    Smoking status: Never Smoker    Smokeless tobacco: Never Used      Comment: former smoker per allscript      Alcohol use No      Comment: social drinker per allscript        Review of Systems   Constitutional: Negative for activity change and fever  Eyes: Positive for visual disturbance  Respiratory: Negative for shortness of breath  Chronic CASON, due to asthma  Cardiovascular: Negative for chest pain  Genitourinary: Negative for dysuria  Objective:      /82 (BP Location: Left arm, Patient Position: Sitting, Cuff Size: Standard)   Resp 16   Ht 5' 10" (1 778 m)   Wt 134 kg (295 lb)   BMI 42 33 kg/m²          Physical Exam   Constitutional: He is oriented to person, place, and time  He appears well-developed and well-nourished  No distress  HENT:   Head: Normocephalic and atraumatic  Right Ear: Hearing, tympanic membrane, external ear and ear canal normal    Left Ear: Hearing, tympanic membrane, external ear and ear canal normal    Mouth/Throat: Oropharynx is clear and moist  No oropharyngeal exudate  Eyes: Pupils are equal, round, and reactive to light  Conjunctivae and EOM are normal    +Cataract bilaterally  Neck: Normal range of motion  Neck supple   No thyromegaly present  Cardiovascular: Normal rate, regular rhythm and normal heart sounds  Exam reveals no gallop and no friction rub  No murmur heard  Pulmonary/Chest: Effort normal and breath sounds normal  No respiratory distress  He has no wheezes  He has no rales  Abdominal: Soft  Bowel sounds are normal  He exhibits no distension and no mass  There is no tenderness  There is no rebound and no guarding  Lymphadenopathy:     He has no cervical adenopathy  Neurological: He is alert and oriented to person, place, and time  Skin: He is not diaphoretic  Psychiatric: He has a normal mood and affect  His behavior is normal  Judgment and thought content normal    Nursing note and vitals reviewed

## 2018-12-28 ENCOUNTER — TELEPHONE (OUTPATIENT)
Dept: FAMILY MEDICINE CLINIC | Facility: CLINIC | Age: 63
End: 2018-12-28

## 2018-12-28 NOTE — TELEPHONE ENCOUNTER
The [place] is cutting back Abel's hours of [address] so his daughter is asking for a letter stating all his diagnoses & that he needs continued [address] at the same rate as he is getting it now  Margarito Black would like this in the letter: her dad is a FALL RISK,  Has INCREASED PAIN, LIMITED STAMINA, HIS RIGHT LEG GIVES OUT  Thank you!

## 2018-12-28 NOTE — TELEPHONE ENCOUNTER
Magdalena Azevedo / Kym Lines - please draw up letter for signature - > list his diagnoses, and medical necessity to continue present services  Thanks    Josette

## 2019-01-04 ENCOUNTER — OFFICE VISIT (OUTPATIENT)
Dept: OBGYN CLINIC | Facility: OTHER | Age: 64
End: 2019-01-04
Payer: COMMERCIAL

## 2019-01-04 ENCOUNTER — APPOINTMENT (OUTPATIENT)
Dept: RADIOLOGY | Facility: OTHER | Age: 64
End: 2019-01-04
Payer: COMMERCIAL

## 2019-01-04 VITALS — SYSTOLIC BLOOD PRESSURE: 146 MMHG | DIASTOLIC BLOOD PRESSURE: 92 MMHG | HEART RATE: 79 BPM

## 2019-01-04 DIAGNOSIS — M25.571 PAIN, JOINT, ANKLE AND FOOT, RIGHT: ICD-10-CM

## 2019-01-04 DIAGNOSIS — S92.424A CLOSED NONDISPLACED FRACTURE OF DISTAL PHALANX OF RIGHT GREAT TOE, INITIAL ENCOUNTER: Primary | ICD-10-CM

## 2019-01-04 DIAGNOSIS — M25.562 CHRONIC PAIN OF LEFT KNEE: ICD-10-CM

## 2019-01-04 DIAGNOSIS — M79.671 PAIN IN RIGHT FOOT: ICD-10-CM

## 2019-01-04 DIAGNOSIS — M25.532 LEFT WRIST PAIN: ICD-10-CM

## 2019-01-04 DIAGNOSIS — M25.562 ACUTE PAIN OF LEFT KNEE: ICD-10-CM

## 2019-01-04 DIAGNOSIS — M79.642 LEFT HAND PAIN: ICD-10-CM

## 2019-01-04 DIAGNOSIS — G89.29 CHRONIC PAIN OF LEFT KNEE: ICD-10-CM

## 2019-01-04 PROCEDURE — 73610 X-RAY EXAM OF ANKLE: CPT

## 2019-01-04 PROCEDURE — 73130 X-RAY EXAM OF HAND: CPT

## 2019-01-04 PROCEDURE — 73110 X-RAY EXAM OF WRIST: CPT

## 2019-01-04 PROCEDURE — 99205 OFFICE O/P NEW HI 60 MIN: CPT | Performed by: FAMILY MEDICINE

## 2019-01-04 PROCEDURE — 73564 X-RAY EXAM KNEE 4 OR MORE: CPT

## 2019-01-04 PROCEDURE — 73630 X-RAY EXAM OF FOOT: CPT

## 2019-01-04 NOTE — PATIENT INSTRUCTIONS
MRI left wrist evaluate for scapholunate dissociation  MRI left knee due to persistent giving out the knee possible obstructive meniscal tear versus ligamentous pathology  Recommended clara taping for right great toe  Explained there is no evidence of definitive fracture on his x-ray of his foot today  There is subtle sclerosis which could be indicative of healing distal phalanx fracture of his great toe  Recommended wearing flat soled shoes and recommended to avoid walking barefoot  I explained to patient risk of non-operative treatment for fracture including but not limited to slippage or movement of fracture and healing of fracture in wrong location that could result in need for surgery or development of arthritis and limited range of motion after healing is resolved  Patient expressed understanding and agreed with plan to pursue non-operative treatment for fracture  Explained the patient that his I pain in sensation of pressure may be related to glaucoma  I recommended immediate evaluation by his eye physician  I instructed him to call eye physician for further recommendations regarding his pain  He does have upcoming surgery for cataracts on Monday of next week, but I explained the patient this is a different diagnoses and not the same as glaucoma  I recommended he speak with his eye physician today and if cannot get a hold of him on the phone to then go to the emergency department to have his eyes evaluated  I also recommended patient follow-up with his primary care physician for his review of systems that are positive including headache, dizziness, rash

## 2019-01-04 NOTE — PROGRESS NOTES
1  Closed nondisplaced fracture of distal phalanx of right great toe, initial encounter     2  Pain in right foot  XR foot 3+ vw right   3  Pain, joint, ankle and foot, right  XR ankle 3+ vw right   4  Left wrist pain  XR wrist 3+ vw left    MRI wrist left wo contrast   5  Left hand pain  XR hand 3+ vw left   6  Chronic pain of left knee  XR knee 4+ vw left injury    MRI knee left  wo contrast     Orders Placed This Encounter   Procedures    XR foot 3+ vw right    XR ankle 3+ vw right    XR wrist 3+ vw left    XR hand 3+ vw left    XR knee 4+ vw left injury    MRI wrist left wo contrast    MRI knee left  wo contrast        Imaging Studies (I personally reviewed images in PACS and report):  X-ray right foot 01/04/2019:  No definitive acute osseous abnormality  There is sclerosis distal phalanx great toe possibly healing transverse fracture  X-ray right ankle 01/04/2019:  No acute osseous abnormality  X-ray left knee 01/04/2019 mild tricompartmental osteoarthritis  No acute osseous abnormality  X-ray left wrist 01/04/2019:  No acute osseous abnormality  There is subtle widening of scapholunate interval concerning for scapholunate dissociation      Past diagnostics at [facility]:  X-ray right foot 12/28/2018 [facility]:  Suspicion for fracture distal phalanx great toe  X-ray left wrist 12/28/2018:  [facility]:  Suspicion of ligamentous injury lunate and navicular  X-ray left knee 12/28/2018 [facility]:  No fracture dislocation  No effusion  No degenerative changes      IMPRESSION:  Multiple injuries status post fall  Date of Injury:  Late November 2018  Initial evaluation:  The emergency department [facility] 12/28/2018  Initial visit with Sports Medicine:  01/04/2019  Follow-up interval from injury:  4-5 weeks    Right great toe nondisplaced healing fracture  Left knee resolved contusion with persistent chronic instability  Left wrist sprain with possible scapholunate dissociation on x-ray  Left hand metacarpal contusion resolving    Return for Follow-up after MRI is performed  Patient Instructions   MRI left wrist evaluate for scapholunate dissociation  MRI left knee due to persistent giving out the knee possible obstructive meniscal tear versus ligamentous pathology  Recommended clara taping for right great toe  Explained there is no evidence of definitive fracture on his x-ray of his foot today  There is subtle sclerosis which could be indicative of healing distal phalanx fracture of his great toe  Recommended wearing flat soled shoes and recommended to avoid walking barefoot  I explained to patient risk of non-operative treatment for fracture including but not limited to slippage or movement of fracture and healing of fracture in wrong location that could result in need for surgery or development of arthritis and limited range of motion after healing is resolved  Patient expressed understanding and agreed with plan to pursue non-operative treatment for fracture  Explained the patient that his I pain in sensation of pressure may be related to glaucoma  I recommended immediate evaluation by his eye physician  I instructed him to call eye physician for further recommendations regarding his pain  He does have upcoming surgery for cataracts on Monday of next week, but I explained the patient this is a different diagnoses and not the same as glaucoma  I recommended he speak with his eye physician today and if cannot get a hold of him on the phone to then go to the emergency department to have his eyes evaluated  I also recommended patient follow-up with his primary care physician for his review of systems that are positive including headache, dizziness, rash            CHIEF COMPLAINT:  Right great toe pain, left wrist sprain, left knee pain    HPI:  Gely Strange Sr  is a 66 y o  male  who presents for       Visit 01/04/2019:  Evaluation of multiple injuries sustained after a fall that occurred approximately 4  weeks ago  Patient presented emergency department at [facility] on 12/28/2018 approximately 4 weeks after his injury  Patient states he has been dealing with chronic intermittent laxity is left knee that gives out on occasion  He tells me that approximately 1 month ago his knee gave out and he fell forward breaking his fall with his left wrist and stubbing his right toe  He did smashed his knee onto the ground with direct impact injury as well  Follow-up right great toe injury:  Patient states that his toe pain is improving since his 1st visit  He is able to ambulate without significant difficulty in regular flat shoes  He states he does have pain when he accidentally stubbed his toe  He does have return of pain with vigorous walking  Follow-up left knee pain:  Pain from his recent injuries now resolved  He tells me he still feels unstable in his left knee as he has felt unstable chronically since childhood and worse in the last 3 years after a fall at that time  Follow-up left wrist pain:  Pain improving  No significant pain at rest; however, he does have pain when he attempts to lift objects in his wrist  Points to the ulnar aspect of his wrist as source of pain  Also has associated symptoms of pain in his left 4th 5th metacarpal   Intermittent mild to moderate intensity soreness worse with moving wrist         Review of Systems   Constitutional: Negative for chills and fever  HENT: Negative for hearing loss  Eyes: Positive for pain (chronic intermittent, feel pressure)  Negative for visual disturbance  Respiratory: Negative for shortness of breath  Cardiovascular: Negative for chest pain  Gastrointestinal: Negative for abdominal pain  Genitourinary: Negative for difficulty urinating and dysuria  Skin: Positive for rash  Neurological: Positive for dizziness, numbness (chronic) and headaches  Negative for weakness  Psychiatric/Behavioral: Negative for suicidal ideas  Following history reviewed and update:    Past Medical History:   Diagnosis Date    Asthma     Diabetes ([facility] [address] )     Hypertension      Past Surgical History:   Procedure Laterality Date    NO PAST SURGERIES       Social History   History   Alcohol Use No     Comment: social drinker per allscript      History   Drug use: Unknown     History   Smoking Status    Never Smoker   Smokeless Tobacco    Never Used     Comment: former smoker per allscript       Family History   Problem Relation Age of Onset    Diabetes Mother     Hypertension Mother     Colon cancer Father     Diabetes Father     Hypertension Father     Diabetes Sister     Diabetes Brother      Allergies   Allergen Reactions    Dust Mite Extract           Physical Exam  /92   Pulse 79     Constitutional:  see vital signs  Gen: well-developed, normocephalic/atraumatic, well-groomed  Eyes: No inflammation or discharge of conjunctiva or lids; sclera clear   Pharynx: no inflammation, lesion, or mass of lips  Neck: supple, no masses, non-distended  MSK: no inflammation, lesion, mass, or clubbing of nails and digits except for other than mentioned below  SKIN: no visible rashes or skin lesions  Pulmonary/Chest: Effort normal  No respiratory distress     NEURO: cranial nerves grossly intact  PSYCH:  Alert and oriented to person, place, and time; recent and remote memory intact; mood normal, no depression, anxiety, or agitation, judgment and insight good and intact     Ortho Exam    RIGHT ANKLE & FOOT EXAM  Observation  GAIT:  normal    Inspection  Erythema: no  Ecchymosis: no  Edema:  none      Tenderness  Proximal Fibula: no  (Maisonneuve frx)  AiTFL: no  (2cm proximal-medial to tip lateral malleolus 92% sens, 29% spec)  ATFL: no  CFL: no  PTFL: no  Achilles:  no  deltoid: No  Peroneal: no  Tibialis Anterior: no  Tibialis Posterior: no    Bony Tenderpoints:  Lateral Malleolus: no  Base of 5th MT: no  Medial Malleolus: no  Navicular: no  Talar dome: No    ROM  Dorsiflexion: intact  Plantarflexion: intact    Muscle Strength  Pronation: intact without pain  Supination: intact without pain    Tib-Fib Squeeze: negative  ([id number] squeeze; 26% sens, 88% spec; rule in test)    Calcaneal Squeeze: negative    Dorsiflexion (+) ER Stress Test: negative  (reproduce ATiFL mech; 71% sens, 63% spec)      RIGHT CALF EXAM:  No swelling erythema or increased warmth  No palpable cords  Tenderness: none  Negative Chi sign    RIGHT FOOT EXAM:  No swelling, erythema or increased warmth  Tenderness: + distal 2nd metatarsal,+ proximal and distal phalanx of great toe  ROM Toes extension: intact  ROM Toes flexion: intact  Strength Toes: 5/5 flex, ext  Sensation intact  Capillary refill intact  Metatarsal squeeze negative     Left Elbow:  no swelling, erythema, or increased warmth  rom full  nontender  no laxity of joint    Left Wrist  no swelling, erythema, or increased warmth  rom full  + ulnar snuffbox  Strength 5/5 flexion extension  no laxity of joint; druj stable; scapholunate ballottement test normal    Left Hand  no erythema  Swelling:   None  Tenderness:+ mild midshaft 4th and 5th metacarpal  rom fingers mcp, pip, dip intact without pain  No digital scissoring or deviation of fingers  no extensor lag  no rotation of fingers  no joint laxity  strenght flexion and extension mcp, pip, dip 5/5  sensation intact  capillary refill intact      LEFT KNEE:  Erythema: no  Swelling: no  Increased Warmth: no  Tenderness: none  Flexion: intact  Extension: intact  Patellar Displacement:  Patellar Tilt:  Patellar Apprehension: negative  Patellar Grind Silvestre's: negative  Lachman's: negative  Drawer: negative  Varus laxity: negative  Valgus laxity: negative  Felisa: + pain medial and lateral joint line without crepitus      Procedures    Total visit time was 60 minutes of which more than 50% was face to face counseling and/or coordination of care with patient regarding their treatment plan as outlined in note

## 2019-01-10 ENCOUNTER — HOSPITAL ENCOUNTER (OUTPATIENT)
Dept: RADIOLOGY | Facility: IMAGING CENTER | Age: 64
Discharge: HOME | End: 2019-01-10
Payer: COMMERCIAL

## 2019-01-10 DIAGNOSIS — M25.562 CHRONIC PAIN OF LEFT KNEE: ICD-10-CM

## 2019-01-10 DIAGNOSIS — M25.532 LEFT WRIST PAIN: ICD-10-CM

## 2019-01-10 DIAGNOSIS — G89.29 CHRONIC PAIN OF LEFT KNEE: ICD-10-CM

## 2019-01-10 PROCEDURE — 73721 MRI JNT OF LWR EXTRE W/O DYE: CPT

## 2019-01-10 PROCEDURE — 73221 MRI JOINT UPR EXTREM W/O DYE: CPT

## 2019-01-11 DIAGNOSIS — J45.20 MILD INTERMITTENT ASTHMA WITHOUT COMPLICATION: ICD-10-CM

## 2019-01-31 ENCOUNTER — OFFICE VISIT (OUTPATIENT)
Dept: OBGYN CLINIC | Facility: OTHER | Age: 64
End: 2019-01-31
Payer: COMMERCIAL

## 2019-01-31 ENCOUNTER — APPOINTMENT (OUTPATIENT)
Dept: RADIOLOGY | Facility: OTHER | Age: 64
End: 2019-01-31
Payer: COMMERCIAL

## 2019-01-31 VITALS
HEIGHT: 70 IN | SYSTOLIC BLOOD PRESSURE: 149 MMHG | BODY MASS INDEX: 42.33 KG/M2 | HEART RATE: 89 BPM | DIASTOLIC BLOOD PRESSURE: 87 MMHG

## 2019-01-31 DIAGNOSIS — S92.424D CLOSED NONDISPLACED FRACTURE OF DISTAL PHALANX OF RIGHT GREAT TOE WITH ROUTINE HEALING, SUBSEQUENT ENCOUNTER: ICD-10-CM

## 2019-01-31 DIAGNOSIS — M25.332 SCAPHOLUNATE DISSOCIATION OF LEFT WRIST: Primary | ICD-10-CM

## 2019-01-31 DIAGNOSIS — M79.671 PAIN IN RIGHT FOOT: ICD-10-CM

## 2019-01-31 DIAGNOSIS — M65.832 EXTENSOR TENOSYNOVITIS OF LEFT WRIST: ICD-10-CM

## 2019-01-31 DIAGNOSIS — S83.242D OTHER TEAR OF MEDIAL MENISCUS OF LEFT KNEE AS CURRENT INJURY, SUBSEQUENT ENCOUNTER: ICD-10-CM

## 2019-01-31 PROCEDURE — 73630 X-RAY EXAM OF FOOT: CPT

## 2019-01-31 PROCEDURE — 99214 OFFICE O/P EST MOD 30 MIN: CPT | Performed by: FAMILY MEDICINE

## 2019-01-31 NOTE — PATIENT INSTRUCTIONS
MRI results discussed today  Provided referral to orthopedic hand surgery for scapholunate dissociation  Also provided referral to physical therapy for medial meniscus tear along with bracing  We can consider referral to orthopedic surgery in the future if no improvement with physical therapy  Recommended buddy taping on an as-needed basis and use of hard-soled shoe for great toe pain  Recommended follow up with podiatry for diabetic foot management

## 2019-01-31 NOTE — PROGRESS NOTES
1  Scapholunate dissociation of left wrist  Ambulatory referral to Orthopedic Surgery   2  Other tear of medial meniscus of left knee as current injury, subsequent encounter  Ambulatory referral to Physical Therapy   3  Closed nondisplaced fracture of distal phalanx of right great toe with routine healing, subsequent encounter  XR foot 3+ vw right   4  Pain in right foot     5  Extensor tenosynovitis of left wrist       Orders Placed This Encounter   Procedures    XR foot 3+ vw right    Ambulatory referral to Orthopedic Surgery    Ambulatory referral to Physical Therapy        Imaging Studies (I personally reviewed images in PACS and report):    Right Foot x-ray 1/10/19 shows unchanged increased sclerosis/signal of distal phalanx without displacement  No acute fracture  MRI left knee 1/10/19 shows undersurface posterior horn medial meniscus tear  MRI left wrist 1/10/19 shows complete tear of scapholunate ligament, extensor carpi ulnaris tendinosis, mild degenerative changes  X-ray right foot 01/04/2019:  No definitive acute osseous abnormality  There is sclerosis distal phalanx great toe possibly healing transverse fracture  X-ray right ankle 01/04/2019:  No acute osseous abnormality  X-ray left knee 01/04/2019 mild tricompartmental osteoarthritis  No acute osseous abnormality  X-ray left wrist 01/04/2019:  No acute osseous abnormality  There is subtle widening of scapholunate interval concerning for scapholunate dissociation      Past diagnostics at [facility]:  X-ray right foot 12/28/2018 [facility]:  Suspicion for fracture distal phalanx great toe  X-ray left wrist 12/28/2018:  [facility]:  Suspicion of ligamentous injury lunate and navicular  X-ray left knee 12/28/2018 [facility]:  No fracture dislocation  No effusion  No degenerative changes      IMPRESSION:  Multiple injuries status post fall  Date of Injury:  Late November 2018  Initial evaluation:  The emergency department [facility] 12/28/2018  Initial visit with Sports Medicine:  01/04/2019  Follow-up interval from injury:  8-9 weeks    Right great toe nondisplaced healing fracture  Left knee resolved contusion with persistent chronic instability  Left Knee Medial Meniscus Tear  Left scapholunate dissociation on MRI  Left hand metacarpal contusion resolving    Return in about 4 weeks (around 2/28/2019)  Patient Instructions   MRI results discussed today  Provided referral to orthopedic hand surgery for scapholunate dissociation  Also provided referral to physical therapy for medial meniscus tear along with bracing  We can consider referral to orthopedic surgery in the future if no improvement with physical therapy  Recommended buddy taping on an as-needed basis and use of hard-soled shoe for great toe pain  Recommended follow up with podiatry for diabetic foot management  CHIEF COMPLAINT:  Right great toe pain, left wrist sprain, left knee pain    HPI:  Paul Lopez  is a 66 y o  male  who presents for       Visit 01/04/2019:  Evaluation of multiple injuries sustained after a fall that occurred approximately 4  weeks ago  Patient presented emergency department at [facility] on 12/28/2018 approximately 4 weeks after his injury  Patient states he has been dealing with chronic intermittent laxity is left knee that gives out on occasion  He tells me that approximately 1 month ago his knee gave out and he fell forward breaking his fall with his left wrist and stubbing his right toe  He did smashed his knee onto the ground with direct impact injury as well  Follow-up right great toe injury:  Patient states that his toe pain is improving since his 1st visit  He is able to ambulate without significant difficulty in regular flat shoes  He states he does have pain when he accidentally stubbed his toe  He does have return of pain with vigorous walking      Follow-up left knee pain:  Pain from his recent injuries now resolved  He tells me he still feels unstable in his left knee as he has felt unstable chronically since childhood and worse in the last 3 years after a fall at that time  Follow-up left wrist pain:  Pain improving  No significant pain at rest; however, he does have pain when he attempts to lift objects in his wrist  Points to the ulnar aspect of his wrist as source of pain  Also has associated symptoms of pain in his left 4th 5th metacarpal   Intermittent mild to moderate intensity soreness worse with moving wrist     1/31/19:  Interval Follow up for multiple injuries from a fall in late November 2018 and recent MRI imaging that was ordered  Left Wrist: Continues to have intermittent pain of the left wrist, located on the dorsal and ulnar aspects, sharp in quality, 5-6/10 intensity, non-radiating  Pain is exacerbated with lifting objects, alleviated with rest  It is associated with pain over dorsal 4th and 5th extensor tendon distribution  No numbness, tingling or weakness  Left Knee: Reports continued subjective instability without mechanical symptoms, as well as intermittent anterior knee pain, sharp in quality, mild to moderate intensity, non-radiating  Has not done a trial of PT  Is a diabetic with insulin therapy  Denies numbness, tingling or weakness  Right great toe: Continues to experience intermittent right great toe pain, intermittent, sharp in quality, mild to moderate intensity, non-radiating  It is exacerbated with walking, alleviated with rest  Is not using a hard-soled shoe, but reports it feels fine with his regular shoes if he ties up his laces tight  Review of Systems   Constitutional: Negative for chills and fever  HENT: Negative for hearing loss  Eyes: Positive for pain (chronic intermittent, feel pressure)  Negative for visual disturbance  Respiratory: Negative for shortness of breath  Cardiovascular: Negative for chest pain     Gastrointestinal: Negative for abdominal pain  Genitourinary: Negative for difficulty urinating and dysuria  Skin: Positive for rash  Neurological: Positive for dizziness, numbness (chronic) and headaches  Negative for weakness  Psychiatric/Behavioral: Negative for suicidal ideas  Following history reviewed and update:    Past Medical History:   Diagnosis Date    Asthma     Diabetes ([facility] [address] )     Hypertension      Past Surgical History:   Procedure Laterality Date    NO PAST SURGERIES       Social History   History   Alcohol Use No     Comment: social drinker per allscript      History   Drug use: Unknown     History   Smoking Status    Never Smoker   Smokeless Tobacco    Never Used     Comment: former smoker per allscript       Family History   Problem Relation Age of Onset    Diabetes Mother     Hypertension Mother     Colon cancer Father     Diabetes Father     Hypertension Father     Diabetes Sister     Diabetes Brother      Allergies   Allergen Reactions    Dust Mite Extract           Physical Exam  /87   Pulse 89   Ht 5' 10" (1 778 m)   BMI 42 33 kg/m²     Constitutional:  see vital signs  Gen: well-developed, normocephalic/atraumatic, well-groomed  Eyes: No inflammation or discharge of conjunctiva or lids; sclera clear   Pharynx: no inflammation, lesion, or mass of lips  Neck: supple, no masses, non-distended  MSK: no inflammation, lesion, mass, or clubbing of nails and digits except for other than mentioned below  SKIN: no visible rashes or skin lesions  Pulmonary/Chest: Effort normal  No respiratory distress     NEURO: cranial nerves grossly intact  PSYCH:  Alert and oriented to person, place, and time; recent and remote memory intact; mood normal, no depression, anxiety, or agitation, judgment and insight good and intact     Ortho Exam    RIGHT FOOT EXAM      RIGHT CALF EXAM:  No swelling erythema or increased warmth  No palpable cords  Tenderness: none  Negative Chi sign    RIGHT FOOT EXAM:  No swelling, erythema or increased warmth  Tenderness: + distal 2nd metatarsal,+ proximal and distal phalanx of great toe  ROM Toes extension: intact  ROM Toes flexion: intact  Strength Toes: 5/5 flex, ext  Sensation intact  Capillary refill intact  Metatarsal squeeze negative    Right Ankle Exam:  No swelling, erythema or increased warmth  No tenderness  5/5 strength     Left Elbow:  no swelling, erythema, or increased warmth  rom full  nontender  no laxity of joint    Left Wrist  no swelling, erythema, or increased warmth  rom full  + ulnar snuffbox, 4th and 5th extensor tendons   Strength 5/5 flexion extension  no laxity of joint; druj stable; scapholunate ballottement test normal    Left Hand  no erythema  Swelling:   None  Tenderness:+ mild midshaft 4th and 5th metacarpal  rom fingers mcp, pip, dip intact without pain  No digital scissoring or deviation of fingers  no extensor lag  no rotation of fingers  no joint laxity  strenght flexion and extension mcp, pip, dip 5/5  sensation intact  capillary refill intact      LEFT KNEE:  Erythema: no  Swelling: no  Increased Warmth: no  Tenderness: patellar tendon  Flexion: intact  Extension: intact  Patellar Displacement:  Patellar Tilt:  Patellar Apprehension: negative  Patellar Grind Silvestre's: negative  Lachman's: negative  Drawer: negative  Varus laxity: negative  Valgus laxity: negative  [facility]: Equivocal medial and lateral      Procedures    Total visit time was 40 minutes of which more than 50% was face to face counseling and/or coordination of care with patient regarding their treatment plan as outlined in note

## 2019-02-06 DIAGNOSIS — E11.9 TYPE 2 DIABETES MELLITUS WITHOUT COMPLICATION, WITHOUT LONG-TERM CURRENT USE OF INSULIN (HCC): ICD-10-CM

## 2019-02-06 DIAGNOSIS — L30.9 ECZEMA, UNSPECIFIED TYPE: Primary | ICD-10-CM

## 2019-02-06 RX ORDER — CLOBETASOL PROPIONATE 0.5 MG/G
OINTMENT TOPICAL DAILY
Qty: 60 G | Refills: 5 | Status: SHIPPED | OUTPATIENT
Start: 2019-02-06 | End: 2020-03-16

## 2019-02-22 DIAGNOSIS — J45.20 MILD INTERMITTENT ASTHMA WITHOUT COMPLICATION: ICD-10-CM

## 2019-03-05 ENCOUNTER — TELEPHONE (OUTPATIENT)
Dept: FAMILY MEDICINE CLINIC | Facility: CLINIC | Age: 64
End: 2019-03-05

## 2019-03-05 DIAGNOSIS — J45.40 MODERATE PERSISTENT ASTHMA WITHOUT COMPLICATION: Primary | ICD-10-CM

## 2019-03-05 RX ORDER — FLUTICASONE FUROATE AND VILANTEROL 200; 25 UG/1; UG/1
1 POWDER RESPIRATORY (INHALATION) DAILY
Qty: 1 INHALER | Refills: 5 | Status: SHIPPED | OUTPATIENT
Start: 2019-03-05 | End: 2019-09-26

## 2019-03-05 NOTE — TELEPHONE ENCOUNTER
Patient's Tg Pea is not covered by their insurance however [facility] Duo and Petra Helena is covered  Please send either of these medications to [facility]  [phone] since the patient has been without for a while

## 2019-03-22 ENCOUNTER — TELEPHONE (OUTPATIENT)
Dept: FAMILY MEDICINE CLINIC | Facility: CLINIC | Age: 64
End: 2019-03-22

## 2019-03-22 NOTE — TELEPHONE ENCOUNTER
Patient is having a hard time using the [facility] Ellipta inhaler, saying it has been really dry whenever he takes it  Messi Archibald is inquiring if he could try Anoro inhaler, and wants to know if this is a spray? If it is not a spray, she is inquiring if we have any dulera inhaler samples? Please advise    Pharmacy is [facility]

## 2019-03-23 NOTE — TELEPHONE ENCOUNTER
Anoro is another dry powder that is inhaled  These are all good meds - and likely the powder is not really something that can be avoided  I strongly recommend that he rinses his mouth / gargles, just after using  Thanks    DrJOANNA

## 2019-03-27 ENCOUNTER — OFFICE VISIT (OUTPATIENT)
Dept: OBGYN CLINIC | Facility: HOSPITAL | Age: 64
End: 2019-03-27
Payer: COMMERCIAL

## 2019-03-27 VITALS
BODY MASS INDEX: 47.41 KG/M2 | HEART RATE: 90 BPM | SYSTOLIC BLOOD PRESSURE: 160 MMHG | DIASTOLIC BLOOD PRESSURE: 82 MMHG | HEIGHT: 66 IN | WEIGHT: 295 LBS

## 2019-03-27 DIAGNOSIS — M77.8 LEFT WRIST TENDONITIS: ICD-10-CM

## 2019-03-27 DIAGNOSIS — M25.332 SCAPHOLUNATE DISSOCIATION OF LEFT WRIST: Primary | ICD-10-CM

## 2019-03-27 PROCEDURE — 99214 OFFICE O/P EST MOD 30 MIN: CPT | Performed by: SURGERY

## 2019-03-27 NOTE — PROGRESS NOTES
Deborrah Hodgkins M D  Attending, Orthopaedic Surgery  Hand, Wrist, and Elbow Surgery  [facility]      ORTHOPAEDIC HAND, WRIST, AND ELBOW OFFICE  VISIT       ASSESSMENT/PLAN:      66 y o  male with complete left SL ligament tear and left ECU tendonitis  Due to the majority of Abel's pain, on exam, appears to be stemming from his ECU tendonitis treatment options were discussed  A CSI was offered, Nicolle Lane declined  SL ligament reconstruction was discussed at length with Nicolle Lane today, which he does not wish to proceed with at this time  He was fit with cock up wrist brace, to be worn at night  OT was ordered today for ECU tendonitis  I will see Nicolle Lane back in 6 weeks time  The patient verbalized understanding of exam findings and treatment plan  We engaged in the shared decision-making process and treatment options were discussed at length with the patient  Surgical and conservative management discussed today along with risks and benefits  Diagnoses and all orders for this visit:    Scapholunate dissociation of left wrist    Left wrist tendonitis  -     Ambulatory referral to PT/OT hand therapy; Future        Follow Up:  Return in about 6 weeks (around 5/8/2019)  To Do Next Visit:  Re-evaluation of current issue      ____________________________________________________________________________________________________________________________________________      CHIEF COMPLAINT:  Chief Complaint   Patient presents with    Left Wrist - Pain       SUBJECTIVE:  Everett Fallon is a 66y o  year old RHD male who presents to the office today for a left wrist SL tear  Nicolle Lane was referred to the office by Dr Johanny Lane states that he had a fall aprox   2 months ago, injuring his left wrist  Nicolle Lane states that he is experiencing pain to the dorsal aspect of his left wrist  Nicolle Lane states that his dorsal wrist pain is made worse with lifting objects and better at rest  He is taking ibuprofen as needed for pain control  Valentino Garza has diabetes         Pain/symptom timing:  Worse during the day when active  Pain/symptom context:  Worse with activites and work  Pain/symptom modifying factors:  Rest makes better, activities make worse  Pain/symptom associated signs/symptoms: none    Prior treatment   · NSAIDsYes   · Injections No   · Bracing/Orthotics No    Physical Therapy No     I have personally reviewed all the relevant PMH, PSH, SH, FH, Medications and allergies      PAST MEDICAL HISTORY:  Past Medical History:   Diagnosis Date    Asthma     Diabetes ([facility] [address] )     Hypertension        PAST SURGICAL HISTORY:  Past Surgical History:   Procedure Laterality Date    NO PAST SURGERIES         FAMILY HISTORY:  Family History   Problem Relation Age of Onset    Diabetes Mother     Hypertension Mother     Colon cancer Father     Diabetes Father     Hypertension Father     Diabetes Sister     Diabetes Brother        SOCIAL HISTORY:  Social History     Tobacco Use    Smoking status: Never Smoker    Smokeless tobacco: Never Used    Tobacco comment: former smoker per allscript     Substance Use Topics    Alcohol use: No     Comment: social drinker per allscript     Drug use: Not on file       MEDICATIONS:    Current Outpatient Medications:     ADMELOG SOLOSTAR 100 units/mL injection pen, INJECT 40 UNITS SQ TID WC, Disp: , Rfl: 1    albuterol (VENTOLIN HFA) 90 mcg/act inhaler, Inhale 2 puffs, Disp: , Rfl:     albuterol (VENTOLIN HFA) 90 mcg/act inhaler, Inhale 2 puffs every 6 (six) hours as needed for wheezing or shortness of breath, Disp: 18 g, Rfl: 5    aspirin (ECOTRIN LOW STRENGTH) 81 mg EC tablet, Take 81 mg by mouth, Disp: , Rfl:     BD PEN NEEDLE FRANCISCO U/F 32G X 4 MM MISC, USE QID, Disp: , Rfl: 1    Blood Glucose Monitoring Suppl (ACCU-CHEK YADY PLUS) w/Device KIT, by Does not apply route, Disp: , Rfl:     clobetasol (TEMOVATE) 0 05 % ointment, Apply topically daily, Disp: 60 g, Rfl: 5    ergocalciferol (VITAMIN D2) 50,000 units, Take by mouth, Disp: , Rfl:     fluticasone-vilanterol (BREO ELLIPTA) 200-25 MCG/INH inhaler, Inhale 1 puff daily Rinse mouth after use , Disp: 1 Inhaler, Rfl: 5    glucose blood (ACCU-CHEK YADY PLUS) test strip, Use as instructed, Disp: 100 each, Rfl: 5    glucose blood test strip, by In Vitro route 3 (three) times a day, Disp: , Rfl:     ibuprofen (MOTRIN) 600 mg tablet, Take 600 mg by mouth every 8 (eight) hours, Disp: , Rfl:     ketorolac (ACULAR) 0 5 % ophthalmic solution, INSTILL 1 DROP INTO SURGERY EYE QID START 3 DAYS PRIOR TO SURGERY DATE SEPARATE EYE DROPS BY 5 MINUTES OF EACH OTHER, Disp: , Rfl: 2    losartan (COZAAR) 50 mg tablet, Take 50 mg by mouth, Disp: , Rfl:     mupirocin (BACTROBAN) 2 % ointment, Apply topically 3 (three) times a day, Disp: 22 g, Rfl: 0    polymyxin b-trimethoprim (POLYTRIM) ophthalmic solution, INT 1 GTT INTO SURGERY EYE QID  START 3 DAYS PRIOR TO SURGERY DATE  SEPARATE SURGERY EYE DROPS BY 5 MINUTES OF EACH OTHER, Disp: , Rfl: 1    prednisoLONE acetate (PRED FORTE) 1 % ophthalmic suspension, INSTILL 1 DROP INTO SURGERY EYE QID  START 3 DAYS PRIOR TO SURGERY DATE  SEPARATE EYE DROPS BY 5 MINUTES OF EACH OTHER , Disp: , Rfl: 2    tadalafil (CIALIS) 20 MG tablet, Take by mouth, Disp: , Rfl:     TRESIBA FLEXTOUCH 200 units/mL CONCENTRATED U-200 injection pen, INJEST 54 UNITS SC D, Disp: , Rfl: 6    ALLERGIES:  Allergies   Allergen Reactions    Dust Mite Extract            REVIEW OF SYSTEMS:  Review of Systems   Constitutional: Negative for chills, fever and unexpected weight change  HENT: Negative for hearing loss, nosebleeds and sore throat  Eyes: Negative for pain, redness and visual disturbance  Respiratory: Negative for cough, shortness of breath and wheezing  Cardiovascular: Negative for chest pain, palpitations and leg swelling  Gastrointestinal: Negative for abdominal pain, nausea and vomiting  Endocrine: Negative for polydipsia and polyuria  Genitourinary: Negative for difficulty urinating and hematuria  Musculoskeletal: Negative for arthralgias, joint swelling and myalgias  Skin: Negative for rash and wound  Neurological: Negative for dizziness, numbness and headaches  Psychiatric/Behavioral: Negative for decreased concentration, dysphoric mood and suicidal ideas  The patient is not nervous/anxious  VITALS:  Vitals:    03/27/19 1522   BP: 160/82   Pulse: 90       LABS:  HgA1c:   Lab Results   Component Value Date    HGBA1C 11 1 (H) 12/19/2016     BMP:   Lab Results   Component Value Date    GLUCOSE 146 (H) 02/27/2015    CALCIUM 9 3 12/19/2016     02/27/2015    K 4 4 12/19/2016    CO2 32 12/19/2016     12/19/2016    BUN 15 12/19/2016    CREATININE 0 91 12/19/2016       _____________________________________________________  PHYSICAL EXAMINATION:  General: well developed and well nourished, alert, oriented times 3 and appears comfortable  Psychiatric: Normal  HEENT: Normocephalic, Atraumatic Trachea Midline, No torticollis  Pulmonary: No audible wheezing or respiratory distress   Cardiovascular: No pitting edema, 2+ radial pulse   Skin: No masses, erythema, lacerations, fluctation, ulcerations  Neurovascular: Sensation Intact to the Median, Ulnar, Radial Nerve, Motor Intact to the Median, Ulnar, Radial Nerve and Pulses Intact  Musculoskeletal: Normal, except as noted in detailed exam and in HPI  MUSCULOSKELETAL EXAMINATION:    Left wrist  Fovea positive tender to palpation, SL mild tender to palpation and ECU positive tender to palpation  Range of motion of the left wrist  Is normal   There is no swelling present  There is no ecchymosis noted      Pulses are present and capillary fill is normal      Vallarie Libby Test is negative for pain and positive for clunk left wrist     ___________________________________________________  STUDIES REVIEWED:  I have personally reviewed AP lateral and oblique radiographs of   the left wrist which demonstrates no acute fracture dislocation does however have significant SL widening       Evaluation of the MRI with the patient coronal sagittal and axial series demonstrates a complete  scapholunate ligament          PROCEDURES PERFORMED:  Procedures  No Procedures performed today    _____________________________________________________      Enrique Hyde    I,:   Greg Welch am acting as a scribe while in the presence of the attending physician :        I,:   Ofe Maxwell MD personally performed the services described in this documentation    as scribed in my presence :

## 2019-04-09 DIAGNOSIS — J45.20 MILD INTERMITTENT ASTHMA WITHOUT COMPLICATION: Primary | ICD-10-CM

## 2019-04-09 RX ORDER — ALBUTEROL SULFATE 90 UG/1
2 AEROSOL, METERED RESPIRATORY (INHALATION) EVERY 6 HOURS PRN
Qty: 1 INHALER | Refills: 5 | Status: SHIPPED | OUTPATIENT
Start: 2019-04-09 | End: 2019-07-22

## 2019-05-08 ENCOUNTER — OFFICE VISIT (OUTPATIENT)
Dept: OBGYN CLINIC | Facility: HOSPITAL | Age: 64
End: 2019-05-08
Payer: COMMERCIAL

## 2019-05-08 VITALS
DIASTOLIC BLOOD PRESSURE: 84 MMHG | WEIGHT: 295 LBS | HEART RATE: 82 BPM | BODY MASS INDEX: 47.41 KG/M2 | HEIGHT: 66 IN | SYSTOLIC BLOOD PRESSURE: 131 MMHG

## 2019-05-08 DIAGNOSIS — M77.8 LEFT WRIST TENDONITIS: Primary | ICD-10-CM

## 2019-05-08 DIAGNOSIS — M25.332 SCAPHOLUNATE DISSOCIATION OF LEFT WRIST: ICD-10-CM

## 2019-05-08 PROCEDURE — 99213 OFFICE O/P EST LOW 20 MIN: CPT | Performed by: SURGERY

## 2019-05-15 ENCOUNTER — EVALUATION (OUTPATIENT)
Dept: OCCUPATIONAL THERAPY | Age: 64
End: 2019-05-15
Payer: COMMERCIAL

## 2019-05-15 DIAGNOSIS — M77.8 ENTHESOPATHY OF WRIST AND CARPUS: Primary | ICD-10-CM

## 2019-05-15 PROCEDURE — 97110 THERAPEUTIC EXERCISES: CPT

## 2019-05-15 PROCEDURE — 97140 MANUAL THERAPY 1/> REGIONS: CPT

## 2019-05-15 PROCEDURE — 97165 OT EVAL LOW COMPLEX 30 MIN: CPT

## 2019-05-27 DIAGNOSIS — J45.20 MILD INTERMITTENT ASTHMA WITHOUT COMPLICATION: Chronic | ICD-10-CM

## 2019-05-28 RX ORDER — ALBUTEROL SULFATE 90 UG/1
AEROSOL, METERED RESPIRATORY (INHALATION)
Qty: 18 G | Refills: 0 | Status: SHIPPED | OUTPATIENT
Start: 2019-05-28 | End: 2019-08-09 | Stop reason: SDUPTHER

## 2019-07-22 ENCOUNTER — OFFICE VISIT (OUTPATIENT)
Dept: FAMILY MEDICINE CLINIC | Facility: CLINIC | Age: 64
End: 2019-07-22
Payer: COMMERCIAL

## 2019-07-22 VITALS
WEIGHT: 311.6 LBS | HEIGHT: 66 IN | OXYGEN SATURATION: 95 % | TEMPERATURE: 97.6 F | RESPIRATION RATE: 18 BRPM | HEART RATE: 79 BPM | BODY MASS INDEX: 50.08 KG/M2

## 2019-07-22 DIAGNOSIS — R07.9 CHEST PAIN, UNSPECIFIED TYPE: Primary | ICD-10-CM

## 2019-07-22 DIAGNOSIS — E66.01 MORBID OBESITY WITH BODY MASS INDEX OF 40.0-44.9 IN ADULT (HCC): ICD-10-CM

## 2019-07-22 DIAGNOSIS — Z79.4 DIABETES MELLITUS TYPE 2, INSULIN DEPENDENT (HCC): ICD-10-CM

## 2019-07-22 DIAGNOSIS — E11.9 DIABETES MELLITUS TYPE 2, INSULIN DEPENDENT (HCC): ICD-10-CM

## 2019-07-22 PROCEDURE — 1036F TOBACCO NON-USER: CPT | Performed by: FAMILY MEDICINE

## 2019-07-22 PROCEDURE — 3008F BODY MASS INDEX DOCD: CPT | Performed by: FAMILY MEDICINE

## 2019-07-22 PROCEDURE — 99214 OFFICE O/P EST MOD 30 MIN: CPT | Performed by: FAMILY MEDICINE

## 2019-07-22 NOTE — PROGRESS NOTES
Assessment/Plan:    No problem-specific Assessment & Plan notes found for this encounter  Diagnoses and all orders for this visit:    Chest pain, unspecified type  Comments:  Pt is stable on exam   Symptoms resolved; had reassuring recent cardiac w/u  Diabetes mellitus type 2, insulin dependent (HCC)  Comments:  Chronic; under the care of Endocrinology at [facility]  Ankush Schulte is to continue to work on a lower carb diet, getting regular exercise, and weight loss  Morbid obesity with body mass index of 40 0-44 9 in adult [facility])  Comments:  As above  Subjective:      Patient ID: Jose Bell is a 66 y o  male  Records reviewed from hospitalization 6/3/19 at [facility] for chest pain  Troponins negative x 3, ECG normal, Chest Xray was normal, and a Lexiscan NM Stress Test was normal   No CP since  Still seeing Dr Amanda Tao of Endocrinology with [facility] tomorrow  Echo looked good as well - with EF of 60%          The following portions of the patient's history were reviewed and updated as appropriate: allergies, current medications, past social history, past surgical history and problem list     Past Medical History:   Diagnosis Date    Asthma     Diabetes ([facility] [address] )     Hypertension        Current Outpatient Medications:     ADMELOG SOLOSTAR 100 units/mL injection pen, INJECT 40 UNITS SQ TID WC, Disp: , Rfl: 1    albuterol (PROVENTIL HFA,VENTOLIN HFA) 90 mcg/act inhaler, INHALE 2 PUFFS BY MOUTH EVERY 6 HOURS AS NEEDED FOR WHEEZING OR SHORTNESS OF BREATH, Disp: 18 g, Rfl: 0    aspirin (ECOTRIN LOW STRENGTH) 81 mg EC tablet, Take 81 mg by mouth, Disp: , Rfl:     BD PEN NEEDLE FRANCISCO U/F 32G X 4 MM MISC, USE QID, Disp: , Rfl: 1    Blood Glucose Monitoring Suppl (ACCU-CHEK YADY PLUS) w/Device KIT, by Does not apply route, Disp: , Rfl:     clobetasol (TEMOVATE) 0 05 % ointment, Apply topically daily, Disp: 60 g, Rfl: 5    ergocalciferol (VITAMIN D2) 50,000 units, Take by mouth, Disp: , Received correspondence from pharmacy about this med that was refilled yesterday. They need a Dx attached so they can bill to Medicare. Thanks! Rfl:     fluticasone-vilanterol (BREO ELLIPTA) 200-25 MCG/INH inhaler, Inhale 1 puff daily Rinse mouth after use , Disp: 1 Inhaler, Rfl: 5    glucose blood (ACCU-CHEK YADY PLUS) test strip, Use as instructed, Disp: 100 each, Rfl: 5    ibuprofen (MOTRIN) 600 mg tablet, Take 600 mg by mouth every 8 (eight) hours, Disp: , Rfl:     losartan (COZAAR) 50 mg tablet, Take 50 mg by mouth, Disp: , Rfl:     mupirocin (BACTROBAN) 2 % ointment, Apply topically 3 (three) times a day, Disp: 22 g, Rfl: 0    tadalafil (CIALIS) 20 MG tablet, Take by mouth, Disp: , Rfl:     TRESIBA FLEXTOUCH 200 units/mL CONCENTRATED U-200 injection pen, INJEST 54 UNITS SC D, Disp: , Rfl: 6    ketorolac (ACULAR) 0 5 % ophthalmic solution, INSTILL 1 DROP INTO SURGERY EYE QID START 3 DAYS PRIOR TO SURGERY DATE SEPARATE EYE DROPS BY 5 MINUTES OF EACH OTHER, Disp: , Rfl: 2    polymyxin b-trimethoprim (POLYTRIM) ophthalmic solution, INT 1 GTT INTO SURGERY EYE QID  START 3 DAYS PRIOR TO SURGERY DATE  SEPARATE SURGERY EYE DROPS BY 5 MINUTES OF EACH OTHER, Disp: , Rfl: 1    prednisoLONE acetate (PRED FORTE) 1 % ophthalmic suspension, INSTILL 1 DROP INTO SURGERY EYE QID  START 3 DAYS PRIOR TO SURGERY DATE  SEPARATE EYE DROPS BY 5 MINUTES OF EACH OTHER , Disp: , Rfl: 2    Allergies   Allergen Reactions    Dust Mite Extract      Social History     Tobacco Use    Smoking status: Never Smoker    Smokeless tobacco: Never Used    Tobacco comment: former smoker per allscript     Substance Use Topics    Alcohol use: No     Comment: social drinker per allscript     Drug use: Not on file         Review of Systems   Constitutional: Negative for activity change and fever  Respiratory: Negative for cough and shortness of breath  Cardiovascular: Negative for chest pain  Gastrointestinal: Negative for abdominal pain and blood in stool  Endocrine:        BG readings have been up            Objective:      Pulse 79   Temp 97 6 °F (36 4 °C) (Tympanic) Resp 18   Ht 5' 5 5" (1 664 m)   Wt (!) 141 kg (311 lb 9 6 oz)   SpO2 95%   BMI 51 06 kg/m²   BP = 138/84 RA seated  Physical Exam   Constitutional: He is oriented to person, place, and time  He appears well-developed and well-nourished  No distress  HENT:   Head: Normocephalic and atraumatic  Eyes: Conjunctivae are normal    Neck: Normal range of motion  Neck supple  No JVD present  No thyromegaly present  Cardiovascular: Normal rate, regular rhythm and normal heart sounds  Exam reveals no gallop and no friction rub  No murmur heard  Pulmonary/Chest: Effort normal and breath sounds normal  No stridor  No respiratory distress  He has no wheezes  He has no rales  Lymphadenopathy:     He has no cervical adenopathy  Neurological: He is alert and oriented to person, place, and time  Skin: He is not diaphoretic  Psychiatric: He has a normal mood and affect  His behavior is normal  Judgment and thought content normal    Nursing note and vitals reviewed

## 2019-08-09 DIAGNOSIS — J45.20 MILD INTERMITTENT ASTHMA WITHOUT COMPLICATION: Chronic | ICD-10-CM

## 2019-08-09 RX ORDER — ALBUTEROL SULFATE 90 UG/1
2 AEROSOL, METERED RESPIRATORY (INHALATION) EVERY 6 HOURS PRN
Qty: 1 INHALER | Refills: 3 | Status: SHIPPED | OUTPATIENT
Start: 2019-08-09 | End: 2019-11-24 | Stop reason: SDUPTHER

## 2019-08-09 NOTE — TELEPHONE ENCOUNTER
PT NEEDS REFILL OF HIS ALBUTEROL INHALER SENT TO [address] [facility]

## 2019-09-11 ENCOUNTER — OFFICE VISIT (OUTPATIENT)
Dept: BARIATRICS | Facility: CLINIC | Age: 64
End: 2019-09-11

## 2019-09-11 VITALS
HEART RATE: 72 BPM | WEIGHT: 312.2 LBS | RESPIRATION RATE: 17 BRPM | DIASTOLIC BLOOD PRESSURE: 86 MMHG | TEMPERATURE: 98.2 F | HEIGHT: 69 IN | BODY MASS INDEX: 46.24 KG/M2 | SYSTOLIC BLOOD PRESSURE: 138 MMHG

## 2019-09-11 DIAGNOSIS — Z01.818 PREOP EXAMINATION: ICD-10-CM

## 2019-09-11 DIAGNOSIS — E11.9 DIABETES MELLITUS TYPE 2, INSULIN DEPENDENT (HCC): ICD-10-CM

## 2019-09-11 DIAGNOSIS — E66.01 OBESITY, CLASS III, BMI 40-49.9 (MORBID OBESITY) (HCC): Primary | ICD-10-CM

## 2019-09-11 DIAGNOSIS — E66.01 MORBID OBESITY WITH BODY MASS INDEX OF 40.0-44.9 IN ADULT (HCC): Primary | ICD-10-CM

## 2019-09-11 DIAGNOSIS — Z79.4 DIABETES MELLITUS TYPE 2, INSULIN DEPENDENT (HCC): ICD-10-CM

## 2019-09-11 PROCEDURE — RECHECK

## 2019-09-11 NOTE — PROGRESS NOTES
Bariatric Nutrition Assessment Note    Type of surgery    Preop  Surgery Date: TBD  Surgeon: Undecided    Nutrition Assessment   Larry Greene Sr   66 y o   male     Wt with BMI of 25: 168#  Pre-Op Excess Wt: 144#  Height 5' 8 75" (1 746 m), weight (!) 142 kg (312 lb 3 2 oz)  Body mass index is 46 44 kg/m²    Weight History   Onset of Obesity: Adult  Family history of obesity: Yes  Wt Loss Attempts: Exercise  Maximum Wt Lost: 125# (2 times many years ago)  Bear Сергей Equation  2800 cals/day to maintain weight                                         2300 cals/day to lose 1#/week                                         1800 cals/day to lose 2#/week  Pt to lose 16# prior to surgery    Review of History and Medications   Past Medical History:   Diagnosis Date    Asthma     Diabetes ([facility] [address] )     Hypertension      Past Surgical History:   Procedure Laterality Date    CATARACT EXTRACTION, BILATERAL      NO PAST SURGERIES       Social History     Socioeconomic History    Marital status: Unknown     Spouse name: Not on file    Number of children: Not on file    Years of education: Not on file    Highest education level: Not on file   Occupational History    Not on file   Social Needs    Financial resource strain: Not on file    Food insecurity:     Worry: Not on file     Inability: Not on file    Transportation needs:     Medical: Not on file     Non-medical: Not on file   Tobacco Use    Smoking status: Never Smoker    Smokeless tobacco: Never Used    Tobacco comment: former smoker per allscript     Substance and Sexual Activity    Alcohol use: No     Comment: social drinker per allscript     Drug use: Not on file    Sexual activity: Not on file   Lifestyle    Physical activity:     Days per week: Not on file     Minutes per session: Not on file    Stress: Not on file   Relationships    Social connections:     Talks on phone: Not on file     Gets together: Not on file     Attends Restorationism service: Not on file     Active member of club or organization: Not on file     Attends meetings of clubs or organizations: Not on file     Relationship status: Not on file    Intimate partner violence:     Fear of current or ex partner: Not on file     Emotionally abused: Not on file     Physically abused: Not on file     Forced sexual activity: Not on file   Other Topics Concern    Not on file   Social History Narrative    Not on file       Current Outpatient Medications:     ADMELOG SOLOSTAR 100 units/mL injection pen, INJECT 40 UNITS SQ TID WC, Disp: , Rfl: 1    albuterol (PROVENTIL HFA,VENTOLIN HFA) 90 mcg/act inhaler, Inhale 2 puffs every 6 (six) hours as needed for wheezing, Disp: 1 Inhaler, Rfl: 3    aspirin (ECOTRIN LOW STRENGTH) 81 mg EC tablet, Take 81 mg by mouth, Disp: , Rfl:     BD PEN NEEDLE FRANCISCO U/F 32G X 4 MM MISC, USE QID, Disp: , Rfl: 1    Blood Glucose Monitoring Suppl (ACCU-CHEK YADY PLUS) w/Device KIT, by Does not apply route, Disp: , Rfl:     clobetasol (TEMOVATE) 0 05 % ointment, Apply topically daily, Disp: 60 g, Rfl: 5    ergocalciferol (VITAMIN D2) 50,000 units, Take by mouth, Disp: , Rfl:     fluticasone-vilanterol (BREO ELLIPTA) 200-25 MCG/INH inhaler, Inhale 1 puff daily Rinse mouth after use , Disp: 1 Inhaler, Rfl: 5    glucose blood (ACCU-CHEK YADY PLUS) test strip, Use as instructed, Disp: 100 each, Rfl: 5    ibuprofen (MOTRIN) 600 mg tablet, Take 600 mg by mouth every 8 (eight) hours, Disp: , Rfl:     ketorolac (ACULAR) 0 5 % ophthalmic solution, INSTILL 1 DROP INTO SURGERY EYE QID START 3 DAYS PRIOR TO SURGERY DATE SEPARATE EYE DROPS BY 5 MINUTES OF EACH OTHER, Disp: , Rfl: 2    losartan (COZAAR) 50 mg tablet, Take 50 mg by mouth, Disp: , Rfl:     mupirocin (BACTROBAN) 2 % ointment, Apply topically 3 (three) times a day, Disp: 22 g, Rfl: 0    polymyxin b-trimethoprim (POLYTRIM) ophthalmic solution, INT 1 GTT INTO SURGERY EYE QID  START 3 DAYS PRIOR TO SURGERY DATE  SEPARATE SURGERY EYE DROPS BY 5 MINUTES OF EACH OTHER, Disp: , Rfl: 1    prednisoLONE acetate (PRED FORTE) 1 % ophthalmic suspension, INSTILL 1 DROP INTO SURGERY EYE QID  START 3 DAYS PRIOR TO SURGERY DATE  SEPARATE EYE DROPS BY 5 MINUTES OF EACH OTHER , Disp: , Rfl: 2    tadalafil (CIALIS) 20 MG tablet, Take by mouth, Disp: , Rfl:     TRESIBA FLEXTOUCH 200 units/mL CONCENTRATED U-200 injection pen, INJEST 54 UNITS SC D, Disp: , Rfl: 6  Food Intake and Lifestyle Assessment   Food Intake Assessment completed via usual diet recall  Breakfast: 2 eggs 2 toast coffee with Splenda  Snack: crackers piece of cheese   Lunch: skips or snacks  Snack: cookies, chips, or fruit  Dinner: meat, rice (diet) and beans  Snack: cheese crackers, tries not to  Beverage intake: water  Protein supplement: none  Estimated protein intake per day: >75 gm  Estimated fluid intake per day: >80 oz  Meals eaten away from home: 2-3 times/week  Typical meal pattern: 2-3 meals per day and 2-3 snacks per day  Eating Behaviors: Consumption of high calorie/ high fat foods, Large portion sizes, Craves sweet foods and Craves salty foods  Food allergies or intolerances: Allergies   Allergen Reactions    Dust Mite Extract      Cultural or Faith considerations: [place]    Physical Assessment  Physical Activity  Types of exercise: None  Current physical limitations: hip, knee problems    Psychosocial Assessment   Support systems: children  Socioeconomic factors: lives with son and wife    Nutrition Diagnosis  Diagnosis: Overweight / Obesity (NC-3 3)  Related to: Physical inactivity and Excessive energy intake  As Evidenced by: BMI >25     Nutrition Prescription: Recommend the following diet  Low fat, Low sugar and High protein    Interventions and Teaching   Discussed pre-op and post-op nutrition guidelines  Patient educated and handouts provided    Surgical changes to stomach / GI  Capacity of post-surgery stomach  Diet progression  Adequate hydration  Sugar and fat restriction to decrease "dumping syndrome"  Fat restriction to decrease steatorrhea  Expected weight loss  Weight loss plateaus/ possibility of weight regain  Exercise  Suggestions for pre-op diet  Nutrition considerations after surgery  Protein supplements  Meal planning and preparation  Appropriate carbohydrate, protein, and fat intake, and food/fluid choices to maximize safe weight loss, nutrient intake, and tolerance   Dietary and lifestyle changes  Possible problems with poor eating habits  Intuitive eating  Techniques for self monitoring and keeping daily food journal  Vitamin / Mineral supplementation of Multivitamin with minerals and Vitamin D    Education provided to: patient and family    Barriers to learning: No barriers identified  Readiness to change: preparation    Prior research on procedure: discussed with provider    Comprehension: verbalizes understanding     Expected Compliance: good  Recommendations  Pt is an appropriate candidate for surgery   Yes  Evaluation / Monitoring  Dietitian to Monitor: Eating pattern as discussed Body weight Physical activity    Goals  Food journal, Exercise 30 minutes 5 times per week, Complete lession plans 1-6, Eat 3 meals per day and Eliminate mindless snacking    Time Spent:   1 Hour

## 2019-09-11 NOTE — PROGRESS NOTES
Bariatric Behavioral Health Evaluation    Presenting Problem: Ayad Krueger,  1955  Patient would like to lose weight to help with his chronic conditions  Is the patient seeking Bariatric Surgery Eval? Yes  If yes how long have you researched this surgery option  Patient has been talking with doctors about weight management solutions for the past three years and he is now wanting to pursue it  Realizes Post- Op Requirements? Yes     Pre-morbid level of function and history of present illness: Patient struggles with diabetes and high blood pressure, has some joint pain but doesn't limit mobility, high cholesterol  Psychiatric/Psychological Treatment Diagnosis: Patient denies any Axis I diagnosis or substance abuse disorder  Patient stopped smoking and drinking alcohol in   Outpatient Counselor No     Psychiatrist No     Have you had Inpatient Treatment? No    Family Constellation (include relationship with each and Psych/Med HX)    Father  obesity and tobacco use    Domestic Violence No    Abuse History:  None    Social situations: linving with spouse and children in home    Additional comments/stressors related to family/relationships/peer support: Patient struggles weight and the effect it has on his health  He denies any other stressors  Patient is aware of protein shake and vitamin expense, he does not see it being a problem to afford       Physical/Psychological Assessment:     Appearance: appropriate  Sociability: average  Affect: appropriate  Mood: calm  Thought Process: coherent  Speech: normal  Content: no impairment  Orientation: person  Yes , place  Yes , time  Yes , normal attention span  Yes , normal memory  Yes  , decreased in concentration ability  No and normal judgement  Yes   Insight: emotional  good    Risk Assessment:     Recommendations: Recommended for surgery  yes and Patient meets the criteria to be a member of [facility] Bariatric surgery program      Risk of Harm to Self or Others: Denies any SI/HI    Observation:     Access to weapons no     Based on the previous information, the client presents the following risk of harm to self or others: low    BARIATRIC [place]    I have received education related to my bariatric surgery process and understand:    Patients may be required to complete a psychiatric evaluation and receive clearance for surgery from their psychiatrist     Patients who undergo weight loss surgery are at higher risk of increased mental health concerns and suicide attempts  Patients may be required to complete a full substance abuse evaluation and then complete all treatment recommendations prior to surgery  If diagnosis of abuse/dependence results, patient may be required to remain sober for one (1) year before having bariatric surgery  Patients on psychiatric medications should check with their provider to discuss psychiatric medications and the changes in absorption  Patient should discuss all time release medications with provider and take all medications as prescribed  The recommendation is that there is no use of  any tobacco products, Hookah or  vapes for the bariatric post-operation patient  Bariatric surgery patients should not consume alcohol as a post-operative patient as it may increase risk of numerous health conditions including but not limited to alcohol abuse and ulcers  There is a possibility of weight regain if patient does not follow all program guidelines and recommendations  Bariatric surgery patients should exercise thirty (30) to sixty (60) minutes per day to maintain post-surgical weight loss  Research indicates that bariatric patients are more successful when they see a therapist for up to two (2) years post-op  Patients will follow all medical and dietary recommendations provided      Patient will keep all scheduled appointments and follow up with their physician for a minimum of five (5) years  Patient will take all vitamins as recommended  Post-operative vitamins are life-long  Patient reviewed Bariatric Surgery Education Checklist and agrees they have received education on these issues   Note: Patient denies Axis I diagnosis and substance abuse disorder, he does not drink alcohol or smoke  Risk of alcohol and tobacco use post op were discussed  Patient is appropriate for surgery and recommended to meet with surgeon

## 2019-09-12 DIAGNOSIS — Z12.11 SCREENING FOR COLON CANCER: Primary | ICD-10-CM

## 2019-09-18 ENCOUNTER — OFFICE VISIT (OUTPATIENT)
Dept: BARIATRICS | Facility: CLINIC | Age: 64
End: 2019-09-18
Payer: COMMERCIAL

## 2019-09-18 VITALS
BODY MASS INDEX: 44.89 KG/M2 | SYSTOLIC BLOOD PRESSURE: 136 MMHG | HEIGHT: 69 IN | DIASTOLIC BLOOD PRESSURE: 86 MMHG | HEART RATE: 95 BPM | TEMPERATURE: 98 F | WEIGHT: 303.1 LBS | RESPIRATION RATE: 17 BRPM

## 2019-09-18 DIAGNOSIS — Z79.4 DIABETES MELLITUS TYPE 2, INSULIN DEPENDENT (HCC): ICD-10-CM

## 2019-09-18 DIAGNOSIS — J45.909 ASTHMA: ICD-10-CM

## 2019-09-18 DIAGNOSIS — E66.01 MORBID OBESITY WITH BODY MASS INDEX OF 40.0-44.9 IN ADULT (HCC): Primary | ICD-10-CM

## 2019-09-18 DIAGNOSIS — I10 HTN (HYPERTENSION): ICD-10-CM

## 2019-09-18 DIAGNOSIS — E03.9 HYPOTHYROID: ICD-10-CM

## 2019-09-18 DIAGNOSIS — E11.9 DIABETES MELLITUS TYPE 2, INSULIN DEPENDENT (HCC): ICD-10-CM

## 2019-09-18 PROCEDURE — 99242 OFF/OP CONSLTJ NEW/EST SF 20: CPT | Performed by: PHYSICIAN ASSISTANT

## 2019-09-18 RX ORDER — LEVOTHYROXINE SODIUM 0.03 MG/1
TABLET ORAL
COMMUNITY
Start: 2019-08-23 | End: 2020-02-23 | Stop reason: DRUGHIGH

## 2019-09-18 NOTE — PROGRESS NOTES
Assessment/Plan:  Patient seen at [facility]    Diagnoses and all orders for this visit:    Morbid obesity with body mass index of 40 0-44 9 in adult [facility])     Interested in Vertical Sleeve Gastrectomy with Dr Andrew Vergara  10% Weight loss-patient already down 9 lbs since last visit   Screening labs-Not Completed  Support Group-Not Completed  Cardiac Risk Assessment-Not Completed  Upper Endoscopy-Not Completed; H  Pylori biopsy-Not completed, H pylori stool antigen test ordered-  no  Sleep Medicine Assessment- appointment scheduled October 10  Alcohol and nicotine use is not recommended following bariatric surgery  NSAIDs should be avoided following bariatric surgery    Diabetes mellitus type 2, insulin dependent ([facility] [address] )  - on insulin regimen  - last A1c 9 8    HTN (hypertension)  - on losartan     Hypothyroid  - on levothyroxine  - last TSH 8 51    Asthma  - continue breo-ellipta/albuterol     Follow up in approximately 1 month with Non-Surgical Physician/Advanced Practitioner  Goals:  Food log (ie ) [http://www  Trot,sparkpeople  com,loseit com,calorieking  com,etc]www  myfitnesspal com,sparkpeople  com,loseit com,calorieking  com,etc  baritastic  No sugary beverages  At least 64oz of water daily  Increase physical activity by 10 minutes daily  Gradually increase physical activity to a goal of 5 days per week for 30 minutes of MODERATE intensity PLUS 2 days per week of FULL BODY resistance training  Follow lessons 1-6 in the manual  Follow the 30/60 minute rule  Goal protein intake of 60-80 grams per day  Alcohol and nicotine use is not recommended following bariatric surgery  NSAIDs (Motrin, Advil, Aleve, Naproxen, ibuprofen, etc) should be avoided following bariatric surgery)    Subjective:   Chief Complaint   Patient presents with    Consult     [facility] 1/6 Jagdeep-Weight Consult with OLY Barron  Patient ID: Medhat Moore is a 66 y o  male with excess weight/obesity here to pursue weight management    Past Medical History:   Diagnosis Date    Asthma     Diabetes ([facility] [address] )     Hypertension     Hypothyroidism      HPI:  Obesity/Excess Weight:  Severity: Severe  Onset: since the age of 36, started working second shift and eating at night    Modifiers: Diet and Exercise  Contributing factors: Poor Food Choices  Associated symptoms: comorbid conditions and increased joint pain    The following portions of the patient's history were reviewed and updated as appropriate: allergies, current medications, past family history, past medical history, past social history, past surgical history and problem list   Review of Systems   Constitutional: Negative for chills and fever  HENT: Positive for trouble swallowing (occasional )  Respiratory: Positive for shortness of breath (hx of asthma )  Cardiovascular: Negative for chest pain and palpitations  Gastrointestinal: Negative for abdominal pain, diarrhea, nausea and vomiting  Musculoskeletal: Positive for arthralgias  Skin: Positive for rash (due to diabetes )  Neurological: Negative for dizziness, syncope and light-headedness  Psychiatric/Behavioral:        Denies depression or anxiety        Objective:  /86 (BP Location: Left arm, Patient Position: Sitting, Cuff Size: Large)   Pulse 95   Temp 98 °F (36 7 °C) (Temporal)   Resp 17   Ht 5' 8 75" (1 746 m)   Wt (!) 137 kg (303 lb 1 6 oz)   BMI 45 09 kg/m²   Physical Exam   Constitutional: He is oriented to person, place, and time  He appears well-developed and well-nourished  HENT:   Head: Normocephalic and atraumatic  Neck: Normal range of motion  Pulmonary/Chest: Effort normal and breath sounds normal  No respiratory distress  He has no wheezes  Abdominal: Soft  Bowel sounds are normal  There is no tenderness  Obese abdomen     Musculoskeletal: Normal range of motion  Neurological: He is alert and oriented to person, place, and time  Skin: Skin is warm and dry     Psychiatric: He has a normal mood and affect  Nursing note and vitals reviewed

## 2019-09-18 NOTE — PATIENT INSTRUCTIONS
Goals:  - Learn to eat protein first at every meal and include protein in every snack    - Start to decrease portion sizes  - Limit eating out at restaurants  - Start to increase fruits, vegetables, and whole grains in the diet and decrease desserts/ candy/ high fat and processed foods   - Decrease caffeinated and/or carbonated beverages (16oz of coffee allowed per day)  - Practice the 30/60 minute rule  Stop drinking 30 minutes prior to your meal, with you meal, and for 60 minutes after your meal   - Drink at least 64oz of water or other calorie free beverage per day  - Practice sipping beverages slowly  - Practice chewing your foods thoroughly-- to liquid consistency-- before swallowing   - Decrease/limit alcohol intake  - Keep a food journal   - Start taking a general multivitamin   - You should exercise for at least 30 minutes, 5 days a week

## 2019-09-24 ENCOUNTER — TELEPHONE (OUTPATIENT)
Dept: FAMILY MEDICINE CLINIC | Facility: CLINIC | Age: 64
End: 2019-09-24

## 2019-09-24 NOTE — TELEPHONE ENCOUNTER
Patients wife called and wanted to know if you can write a Rx for the Waver program Enedelia Padilla is in they are requesting him to go back up to 56 hrs for the waver program      Patients Health issues, and conditions and Dx  Please advise if this can be written    If you have any questions please give wife a call back       Fax number: [phone]  Attn: Michelle Franks

## 2019-09-25 ENCOUNTER — ANESTHESIA EVENT (OUTPATIENT)
Dept: GASTROENTEROLOGY | Facility: HOSPITAL | Age: 64
End: 2019-09-25

## 2019-09-25 NOTE — TELEPHONE ENCOUNTER
Needs script written with diagnosis's and his need to go up to 56 hours b/c of health diagnosis  His health has really declined per daughter

## 2019-09-25 NOTE — ANESTHESIA PREPROCEDURE EVALUATION
Review of Systems/Medical History  Patient summary reviewed  Chart reviewed      Cardiovascular  Negative cardio ROS Exercise tolerance (METS): >4,  Hypertension ,    Pulmonary  Negative pulmonary ROS Asthma , well controlled/ stable ,        GI/Hepatic  Negative GI/hepatic ROS          Negative  ROS        Endo/Other  Negative endo/other ROS Diabetes , History of thyroid disease , hypothyroidism,      GYN  Negative gynecology ROS          Hematology  Negative hematology ROS      Musculoskeletal  Negative musculoskeletal ROS        Neurology  Negative neurology ROS      Psychology   Negative psychology ROS              Physical Exam    Airway    Mallampati score: III  TM Distance: >3 FB  Neck ROM: full     Dental       Cardiovascular  Comment: Negative ROS, Cardiovascular exam normal    Pulmonary  Pulmonary exam normal     Other Findings        Anesthesia Plan  ASA Score- 2     Anesthesia Type- IV sedation with anesthesia with ASA Monitors  Additional Monitors:   Airway Plan:     Comment: Maintain native airway with IVGA, monitored with EtCO2  Plan Factors- Patient instructed to abstain from smoking on day of procedure  Patient did not smoke on day of surgery  Induction- intravenous  Postoperative Plan- Plan for postoperative opioid use  Planned trial extubation    Informed Consent- Anesthetic plan and risks discussed with patient  I personally reviewed this patient with the CRNA  Discussed and agreed on the Anesthesia Plan with the CRNA  Stephen Davalos

## 2019-09-26 ENCOUNTER — ANESTHESIA (OUTPATIENT)
Dept: GASTROENTEROLOGY | Facility: HOSPITAL | Age: 64
End: 2019-09-26

## 2019-09-26 ENCOUNTER — HOSPITAL ENCOUNTER (OUTPATIENT)
Dept: GASTROENTEROLOGY | Facility: HOSPITAL | Age: 64
Setting detail: OUTPATIENT SURGERY
Discharge: HOME | End: 2019-09-26
Attending: INTERNAL MEDICINE
Payer: COMMERCIAL

## 2019-09-26 VITALS
TEMPERATURE: 98.2 F | DIASTOLIC BLOOD PRESSURE: 70 MMHG | HEART RATE: 90 BPM | WEIGHT: 303 LBS | RESPIRATION RATE: 20 BRPM | OXYGEN SATURATION: 94 % | HEIGHT: 69 IN | BODY MASS INDEX: 44.88 KG/M2 | SYSTOLIC BLOOD PRESSURE: 149 MMHG

## 2019-09-26 DIAGNOSIS — R10.9 ABDOMINAL PAIN: ICD-10-CM

## 2019-09-26 LAB — GLUCOSE SERPL-MCNC: 149 MG/DL (ref 65–140)

## 2019-09-26 PROCEDURE — 82948 REAGENT STRIP/BLOOD GLUCOSE: CPT

## 2019-09-26 RX ORDER — LIDOCAINE HYDROCHLORIDE 10 MG/ML
0.5 INJECTION, SOLUTION EPIDURAL; INFILTRATION; INTRACAUDAL; PERINEURAL ONCE AS NEEDED
Status: DISCONTINUED | OUTPATIENT
Start: 2019-09-26 | End: 2019-09-30 | Stop reason: HOSPADM

## 2019-09-26 RX ORDER — PROPOFOL 10 MG/ML
INJECTION, EMULSION INTRAVENOUS AS NEEDED
Status: DISCONTINUED | OUTPATIENT
Start: 2019-09-26 | End: 2019-09-26 | Stop reason: SURG

## 2019-09-26 RX ORDER — SODIUM CHLORIDE, SODIUM LACTATE, POTASSIUM CHLORIDE, CALCIUM CHLORIDE 600; 310; 30; 20 MG/100ML; MG/100ML; MG/100ML; MG/100ML
125 INJECTION, SOLUTION INTRAVENOUS CONTINUOUS
Status: DISCONTINUED | OUTPATIENT
Start: 2019-09-26 | End: 2019-09-30 | Stop reason: HOSPADM

## 2019-09-26 RX ORDER — PROPOFOL 10 MG/ML
INJECTION, EMULSION INTRAVENOUS CONTINUOUS PRN
Status: DISCONTINUED | OUTPATIENT
Start: 2019-09-26 | End: 2019-09-26 | Stop reason: SURG

## 2019-09-26 RX ADMIN — SODIUM CHLORIDE, SODIUM LACTATE, POTASSIUM CHLORIDE, AND CALCIUM CHLORIDE: .6; .31; .03; .02 INJECTION, SOLUTION INTRAVENOUS at 10:19

## 2019-09-26 RX ADMIN — PROPOFOL 180 MCG/KG/MIN: 10 INJECTION, EMULSION INTRAVENOUS at 10:21

## 2019-09-26 RX ADMIN — PROPOFOL 40 MG: 10 INJECTION, EMULSION INTRAVENOUS at 10:21

## 2019-09-26 NOTE — H&P
History and Physical -  [facility]  Greg Screen Sr  66 y o  male MRN: [id number]                  HPI: Cristopher Martinez is a 66y o  year old male who presents for screening colonoscopy  The most recent colonoscopy was about 10 years ago  REVIEW OF SYSTEMS: Per the HPI, and otherwise unremarkable  Historical Information   Past Medical History:   Diagnosis Date    Asthma     Diabetes ([address] )     Hypertension     Hypothyroidism      Past Surgical History:   Procedure Laterality Date    CATARACT EXTRACTION, BILATERAL      COLONOSCOPY      MULTIPLE TOOTH EXTRACTIONS       Social History   Social History     Substance and Sexual Activity   Alcohol Use No    Comment: social drinker per allscript      Social History     Substance and Sexual Activity   Drug Use Never     Social History     Tobacco Use   Smoking Status Former Smoker    Last attempt to quit: Fayrene Rumple Years since quittin 7   Smokeless Tobacco Never Used   Tobacco Comment    former smoker per allscript       Family History   Problem Relation Age of Onset    Diabetes Mother     Hypertension Mother     Colon cancer Father     Diabetes Father     Hypertension Father     Diabetes Sister     Diabetes Brother        Meds/Allergies       (Not in a hospital admission)    Allergies   Allergen Reactions    Dust Mite Extract Allergic Rhinitis       Objective     /67   Pulse 79   Temp 98 2 °F (36 8 °C) (Tympanic)   Resp 22   Ht 5' 9" (1 753 m)   Wt (!) 137 kg (303 lb)   SpO2 96%   BMI 44 75 kg/m²       PHYSICAL EXAM    Gen: NAD  CV: RRR  CHEST: Clear  ABD: soft, NT/ND  EXT: no edema      ASSESSMENT/PLAN:  This is a 66y o  year old male here for screening colonoscopy and he is stable and optimized for his procedure

## 2019-09-26 NOTE — ANESTHESIA POSTPROCEDURE EVALUATION
Post-Op Assessment Note    CV Status:  Stable  Pain Score: 0    Pain management: adequate     Mental Status:  Sleepy   Hydration Status:  Stable   PONV Controlled:  None   Airway Patency:  Patent   Post Op Vitals Reviewed: Yes      Staff: CRNA           /69 (09/26/19 1050)    Temp      Pulse 91 (09/26/19 1050)   Resp 20 (09/26/19 1050)    SpO2 97 % (09/26/19 1050)

## 2019-10-01 ENCOUNTER — TELEPHONE (OUTPATIENT)
Dept: FAMILY MEDICINE CLINIC | Facility: CLINIC | Age: 64
End: 2019-10-01

## 2019-10-01 NOTE — TELEPHONE ENCOUNTER
Patient's wife called and patient would a script for a nebulizer with the mask  He also would like the solution as he feels it works better than his albuterol (PROVENTIL HFA,VENTOLIN HFA) 90 mcg/act inhaler    He does want to keep the Albuterol for traveling, etc     Please advise    Skyler Yip1 #[id number], [id number] [facility], [place]

## 2019-10-02 DIAGNOSIS — J45.20 MILD INTERMITTENT ASTHMA WITHOUT COMPLICATION: Primary | ICD-10-CM

## 2019-10-02 RX ORDER — ALBUTEROL SULFATE 2.5 MG/3ML
2.5 SOLUTION RESPIRATORY (INHALATION) EVERY 6 HOURS PRN
Qty: 30 VIAL | Refills: 5 | Status: SHIPPED | OUTPATIENT
Start: 2019-10-02 | End: 2020-12-09 | Stop reason: SDUPTHER

## 2019-10-02 NOTE — TELEPHONE ENCOUNTER
It is ok to put in the 0 083% solution, 75 ml total, to use every 6 hr as needed for wheeze  Thanks    Josette

## 2019-10-03 ENCOUNTER — TELEPHONE (OUTPATIENT)
Dept: FAMILY MEDICINE CLINIC | Facility: CLINIC | Age: 64
End: 2019-10-03

## 2019-10-03 DIAGNOSIS — Z01.818 PREOP EXAMINATION: Primary | ICD-10-CM

## 2019-10-03 DIAGNOSIS — E66.01 MORBID OBESITY (HCC): ICD-10-CM

## 2019-10-03 NOTE — TELEPHONE ENCOUNTER
Dr Carlos Lopez office called  They need a Physician Order put in for:    Dr Katie Mott, Sleep Medicine  [id number]  [id number]    Appt is 10/10/19    Thank you!

## 2019-10-10 ENCOUNTER — OFFICE VISIT (OUTPATIENT)
Dept: SLEEP CENTER | Facility: CLINIC | Age: 64
End: 2019-10-10
Payer: COMMERCIAL

## 2019-10-10 VITALS
BODY MASS INDEX: 45.91 KG/M2 | SYSTOLIC BLOOD PRESSURE: 112 MMHG | WEIGHT: 310 LBS | DIASTOLIC BLOOD PRESSURE: 80 MMHG | HEIGHT: 69 IN

## 2019-10-10 DIAGNOSIS — G47.33 OSA (OBSTRUCTIVE SLEEP APNEA): Primary | ICD-10-CM

## 2019-10-10 DIAGNOSIS — E66.01 MORBID OBESITY (HCC): ICD-10-CM

## 2019-10-10 DIAGNOSIS — Z79.4 DIABETES MELLITUS TYPE 2, INSULIN DEPENDENT (HCC): ICD-10-CM

## 2019-10-10 DIAGNOSIS — E66.01 MORBID OBESITY WITH BODY MASS INDEX OF 40.0-44.9 IN ADULT (HCC): ICD-10-CM

## 2019-10-10 DIAGNOSIS — Z01.818 PREOP EXAMINATION: ICD-10-CM

## 2019-10-10 DIAGNOSIS — E11.9 DIABETES MELLITUS TYPE 2, INSULIN DEPENDENT (HCC): ICD-10-CM

## 2019-10-10 PROCEDURE — 99243 OFF/OP CNSLTJ NEW/EST LOW 30: CPT | Performed by: INTERNAL MEDICINE

## 2019-10-10 NOTE — PROGRESS NOTES
Consultation - Sleep Center   Priscilla Munoz Sr  : 1955  MRN: [id number]      Assessment:  The patient has symptoms consistent with obstructive sleep apnea  He is scheduled for bariatric surgery, which may alleviate his MITALI once he loses weight  He is agreeable to moving forward with testing  Plan:  Diagnostic polysomnography    Follow up: After testing    History of Present Illness:   66 y o male with a long history of overweight  He has loud snoring, but no witnessed apneas  He does awaken with a gasping sensation  He takes frequent short naps during the day, particularly after meals  On occasion he will take longer naps  He denies morning headaches with any regularity  He denies morning dry mouth  He has a history of hypertension but denies any other cardiovascular disease  He has a history of diabetes        Review of Systems      Genitourinary need to urinate more than twice a night and difficulty with erection   Cardiology palpitations/fluttering feeling in the chest and ankle/leg swelling   Gastrointestinal none   Neurology forgetfulness and poor concentration or confusion,    Constitutional fatigue   Integumentary rash or dry skin   Psychiatry none   Musculoskeletal joint pain and back pain   Pulmonary shortness of breath with activity, chest tightness and wheezing   ENT throat clearing   Endocrine none   Hematological none         I have reviewed and updated the review of systems as necessary    Historical Information    Past Medical History:  Diabetes, obesity, asthma    Family History: non-contributory    Social History     Socioeconomic History    Marital status: Unknown     Spouse name: None    Number of children: None    Years of education: None    Highest education level: None   Occupational History    None   Social Needs    Financial resource strain: None    Food insecurity:     Worry: None     Inability: None    Transportation needs:     Medical: None     Non-medical: None   Tobacco Use    Smoking status: Former Smoker     Last attempt to quit:      Years since quittin 7    Smokeless tobacco: Former User    Tobacco comment: former smoker per allscript     Substance and Sexual Activity    Alcohol use: No     Comment: social drinker per allscript     Drug use: Never    Sexual activity: None   Lifestyle    Physical activity:     Days per week: None     Minutes per session: None    Stress: None   Relationships    Social connections:     Talks on phone: None     Gets together: None     Attends [employer] service: None     Active member of club or organization: None     Attends meetings of clubs or organizations: None     Relationship status: None    Intimate partner violence:     Fear of current or ex partner: None     Emotionally abused: None     Physically abused: None     Forced sexual activity: None   Other Topics Concern    None   Social History Narrative    None         Sleep Schedule: unremarkable    Snoring:  Yes    Witnessed Apnea:  No    Medications/Allergies:    Current Outpatient Medications:     ADMELOG SOLOSTAR 100 units/mL injection pen, Inject 20 Units under the skin 3 (three) times a day with meals , Disp: , Rfl: 1    albuterol (2 5 mg/3 mL) 0 083 % nebulizer solution, Take 1 vial (2 5 mg total) by nebulization every 6 (six) hours as needed for wheezing or shortness of breath, Disp: 30 vial, Rfl: 5    albuterol (PROVENTIL HFA,VENTOLIN HFA) 90 mcg/act inhaler, Inhale 2 puffs every 6 (six) hours as needed for wheezing, Disp: 1 Inhaler, Rfl: 3    aspirin (ECOTRIN LOW STRENGTH) 81 mg EC tablet, Take 81 mg by mouth, Disp: , Rfl:     BD PEN NEEDLE FRANCISCO U/F 32G X 4 MM MISC, USE QID, Disp: , Rfl: 1    Blood Glucose Monitoring Suppl (ACCU-CHEK YADY PLUS) w/Device KIT, by Does not apply route, Disp: , Rfl:     clobetasol (TEMOVATE) 0 05 % ointment, Apply topically daily, Disp: 60 g, Rfl: 5    glucose blood (ACCU-CHEK YADY PLUS) test strip, Use as instructed, Disp: 100 each, Rfl: 5    ibuprofen (MOTRIN) 600 mg tablet, Take 600 mg by mouth every 8 (eight) hours, Disp: , Rfl:     levothyroxine 25 mcg tablet, , Disp: , Rfl:     losartan (COZAAR) 50 mg tablet, Take 50 mg by mouth, Disp: , Rfl:     TRESIBA FLEXTOUCH 200 units/mL CONCENTRATED U-200 injection pen, 60 Units , Disp: , Rfl: 6        No notes on file                  Objective:    Vital Signs:   Vitals:    10/10/19 1000   BP: 112/80   Weight: (!) 141 kg (310 lb)   Height: 5' 9" (1 753 m)     Neck Circumference: 19      [place] Sleepiness Scale: Total score: 8    Physical Exam:    General: Alert, appropriate, cooperative, obese    Head: NC/AT, mild retrognathia    Nose: No septal deviation, nares partially obstructed, mucosa normal    Throat: Airway diminished, tongue base thickened, no tonsils visualized    Neck: Normal, no thyromegaly or lymphadenopathy, no JVD    Heart: RR, normal S1 and S2, no murmurs    Chest:  Wheezing bilaterally    Extremity: No clubbing, cyanosis, trace edema    Skin: Warm, dry    Neuro: No motor abnormalities, cranial nerves appear intact      Counseling / Coordination of Care  A description of the counseling / coordination of care: We discussed the pathophysiology of obstructive sleep apnea as well as the possible treatment options  We also discussed the rationale for positive airway pressure therapy  Board Certified Sleep Specialist    Portions of the record may have been created with voice recognition software  Occasional wrong word or "sound a like" substitutions may have occurred due to the inherent limitations of voice recognition software  Read the chart carefully and recognize, using context, where substitutions have occurred

## 2019-10-29 ENCOUNTER — OFFICE VISIT (OUTPATIENT)
Dept: BARIATRICS | Facility: CLINIC | Age: 64
End: 2019-10-29

## 2019-10-29 VITALS — BODY MASS INDEX: 46.48 KG/M2 | WEIGHT: 313.8 LBS | HEIGHT: 69 IN

## 2019-10-29 DIAGNOSIS — E66.01 OBESITY, CLASS III, BMI 40-49.9 (MORBID OBESITY) (HCC): Primary | ICD-10-CM

## 2019-10-29 PROCEDURE — RECHECK

## 2019-10-29 NOTE — PROGRESS NOTES
2/6 Jagdeep- weight check  Pt shares he hasn't yet begun to make eating/behavioral changes  he says he is now ready to start  Denies emotional eating  Reviewed handout on emotional eating  Pt stating now he wants to go to [place]  as daughter is being worked up there  He doesn't want her to have to drive to 2 separate places  pt also states wife will be agreeable to this  Explained pt needs to attend appts  at one office- When checking out Bernie on phone  Pt couldn't wait to reschedule = said needed to meet brother  he will call Dada Faulkner   to r/s

## 2019-11-05 ENCOUNTER — TELEPHONE (OUTPATIENT)
Dept: FAMILY MEDICINE CLINIC | Facility: CLINIC | Age: 64
End: 2019-11-05

## 2019-11-18 ENCOUNTER — TELEPHONE (OUTPATIENT)
Dept: BARIATRICS | Facility: CLINIC | Age: 64
End: 2019-11-18

## 2019-11-18 NOTE — TELEPHONE ENCOUNTER
Shefali Gtzsocorro is looking to schedule her dad's next appt  She stated she called last week to schedule and no one returned her call  I know for a fact I left a message, awaiting a call  This call is also noted in [address]   [place]

## 2019-11-24 DIAGNOSIS — J45.20 MILD INTERMITTENT ASTHMA WITHOUT COMPLICATION: Chronic | ICD-10-CM

## 2019-11-24 RX ORDER — ALBUTEROL SULFATE 90 UG/1
AEROSOL, METERED RESPIRATORY (INHALATION)
Qty: 18 G | Refills: 0 | Status: SHIPPED | OUTPATIENT
Start: 2019-11-24 | End: 2019-12-13 | Stop reason: SDUPTHER

## 2019-12-13 DIAGNOSIS — J45.20 MILD INTERMITTENT ASTHMA WITHOUT COMPLICATION: Chronic | ICD-10-CM

## 2019-12-13 RX ORDER — ALBUTEROL SULFATE 90 UG/1
AEROSOL, METERED RESPIRATORY (INHALATION)
Qty: 18 G | Refills: 0 | Status: SHIPPED | OUTPATIENT
Start: 2019-12-13 | End: 2020-01-23

## 2020-01-23 DIAGNOSIS — J45.20 MILD INTERMITTENT ASTHMA WITHOUT COMPLICATION: Chronic | ICD-10-CM

## 2020-01-23 RX ORDER — ALBUTEROL SULFATE 90 UG/1
AEROSOL, METERED RESPIRATORY (INHALATION)
Qty: 18 G | Refills: 0 | Status: SHIPPED | OUTPATIENT
Start: 2020-01-23 | End: 2020-03-06

## 2020-01-30 ENCOUNTER — TELEPHONE (OUTPATIENT)
Dept: FAMILY MEDICINE CLINIC | Facility: CLINIC | Age: 65
End: 2020-01-30

## 2020-01-30 NOTE — TELEPHONE ENCOUNTER
Patients wife called and wanted to know if something could be sent to the pharmacy symptoms are coughing, congestion, no fever  I did advise that we like to see patients first be she insisted I ask if something could be sent   Please advise      [facility] [address]

## 2020-01-30 NOTE — TELEPHONE ENCOUNTER
?????  How long has he been sick? Etc, etc    We generally do not just order antibiotics, without seeing a patient  Thanks    Josette

## 2020-01-31 ENCOUNTER — OFFICE VISIT (OUTPATIENT)
Dept: FAMILY MEDICINE CLINIC | Facility: CLINIC | Age: 65
End: 2020-01-31
Payer: COMMERCIAL

## 2020-01-31 VITALS
WEIGHT: 309.2 LBS | OXYGEN SATURATION: 96 % | DIASTOLIC BLOOD PRESSURE: 88 MMHG | HEART RATE: 77 BPM | RESPIRATION RATE: 18 BRPM | SYSTOLIC BLOOD PRESSURE: 138 MMHG | BODY MASS INDEX: 45.66 KG/M2 | TEMPERATURE: 98.3 F

## 2020-01-31 DIAGNOSIS — J20.9 ACUTE BRONCHITIS, UNSPECIFIED ORGANISM: Primary | ICD-10-CM

## 2020-01-31 DIAGNOSIS — R53.83 OTHER FATIGUE: ICD-10-CM

## 2020-01-31 PROCEDURE — 99213 OFFICE O/P EST LOW 20 MIN: CPT | Performed by: FAMILY MEDICINE

## 2020-01-31 RX ORDER — INSULIN ASPART 100 [IU]/ML
INJECTION, SOLUTION INTRAVENOUS; SUBCUTANEOUS
COMMUNITY
Start: 2020-01-20

## 2020-01-31 RX ORDER — AZITHROMYCIN 250 MG/1
TABLET, FILM COATED ORAL
Qty: 6 TABLET | Refills: 0 | Status: SHIPPED | OUTPATIENT
Start: 2020-01-31 | End: 2020-02-05

## 2020-01-31 RX ORDER — GUAIFENESIN AND CODEINE PHOSPHATE 100; 10 MG/5ML; MG/5ML
5 SOLUTION ORAL 3 TIMES DAILY PRN
Qty: 180 ML | Refills: 0 | Status: SHIPPED | OUTPATIENT
Start: 2020-01-31

## 2020-01-31 NOTE — PROGRESS NOTES
Assessment/Plan:    No problem-specific Assessment & Plan notes found for this encounter  Diagnoses and all orders for this visit:    Acute bronchitis, unspecified organism  Comments:  Pt stable  Treat with Azithromycin, OTC Cough/Cold Preps PRN (Coricidin HBP), Guiafen/Cod PRN severe cough, Albuterol PRN (has), rest, & good PO hydration  Orders:  -     azithromycin (ZITHROMAX) 250 mg tablet; Take 2 tablets by mouth on day #1, then 1 tab daily for four more days   -     guaifenesin-codeine (GUAIFENESIN AC) 100-10 MG/5ML liquid; Take 5 mL by mouth 3 (three) times a day as needed for cough or congestion    Other fatigue  Comments:  Chronic for pt - checking CBC and TSH  Orders:  -     CBC and differential; Future  -     TSH, 3rd generation with Free T4 reflex; Future    Other orders  -     Insulin Aspart FlexPen (NOVOLOG FLEXPEN) 100 UNIT/ML SOPN; Inject 20 units before breakfast and lunch, before dinner  40 units  Dx: E11 9  -     insulin detemir (LEVEMIR FLEXTOUCH) 100 Units/mL injection pen; Inject 30 units in am and 30 units in pm   -     NOVOLOG FLEXPEN 100 units/mL SOPN  -     glucose blood test strip; For checking bg 2x daily  e11 65          Subjective:      Patient ID: Libra Armijo is a 60 y o  male  Hulon Norma has been sick for a week now  He presents with his wife today  Last used Albuterol yesterday  [place] PDMP was checked, and was clear  The following portions of the patient's history were reviewed and updated as appropriate: allergies, current medications, past family history, past social history, past surgical history and problem list     Past Medical History:   Diagnosis Date    Asthma     Diabetes [address] [address] )     Hypertension     Hypothyroidism        Current Outpatient Medications:     glucose blood test strip, For checking bg 2x daily   e11 65, Disp: , Rfl:     Insulin Aspart FlexPen (NOVOLOG FLEXPEN) 100 UNIT/ML SOPN, Inject 20 units before breakfast and lunch, before dinner 40 units  Dx: E11 9, Disp: , Rfl:     insulin detemir (LEVEMIR FLEXTOUCH) 100 Units/mL injection pen, Inject 30 units in am and 30 units in pm , Disp: , Rfl:     ADMELOG SOLOSTAR 100 units/mL injection pen, Inject 20 Units under the skin 3 (three) times a day with meals , Disp: , Rfl: 1    albuterol (2 5 mg/3 mL) 0 083 % nebulizer solution, Take 1 vial (2 5 mg total) by nebulization every 6 (six) hours as needed for wheezing or shortness of breath, Disp: 30 vial, Rfl: 5    albuterol (PROVENTIL HFA,VENTOLIN HFA) 90 mcg/act inhaler, INHALE 2 PUFFS EVERY 6 HOURS AS NEEDED FOR WHEEZING, Disp: 18 g, Rfl: 0    aspirin (ECOTRIN LOW STRENGTH) 81 mg EC tablet, Take 81 mg by mouth, Disp: , Rfl:     azithromycin (ZITHROMAX) 250 mg tablet, Take 2 tablets by mouth on day #1, then 1 tab daily for four more days  , Disp: 6 tablet, Rfl: 0    BD PEN NEEDLE FRANCISCO U/F 32G X 4 MM MISC, USE QID, Disp: , Rfl: 1    Blood Glucose Monitoring Suppl (ACCU-CHEK YADY PLUS) w/Device KIT, by Does not apply route, Disp: , Rfl:     clobetasol (TEMOVATE) 0 05 % ointment, Apply topically daily, Disp: 60 g, Rfl: 5    glucose blood (ACCU-CHEK YADY PLUS) test strip, Use as instructed, Disp: 100 each, Rfl: 5    guaifenesin-codeine (GUAIFENESIN AC) 100-10 MG/5ML liquid, Take 5 mL by mouth 3 (three) times a day as needed for cough or congestion, Disp: 180 mL, Rfl: 0    ibuprofen (MOTRIN) 600 mg tablet, Take 600 mg by mouth every 8 (eight) hours, Disp: , Rfl:     levothyroxine 25 mcg tablet, , Disp: , Rfl:     losartan (COZAAR) 50 mg tablet, Take 50 mg by mouth, Disp: , Rfl:     NOVOLOG FLEXPEN 100 units/mL SOPN, , Disp: , Rfl:     TRESIBA FLEXTOUCH 200 units/mL CONCENTRATED U-200 injection pen, 60 Units , Disp: , Rfl: 6    Allergies   Allergen Reactions    Dust Mite Extract Allergic Rhinitis     Social History     Tobacco Use    Smoking status: Former Smoker     Last attempt to quit:      Years since quittin 1    Smokeless tobacco: Former User    Tobacco comment: former smoker per allscript     Substance Use Topics    Alcohol use: No     Comment: social drinker per allscript     Drug use: Never       Review of Systems   Constitutional: Positive for fatigue and fever  Negative for activity change  Low grade temps at home  HENT: Positive for congestion and rhinorrhea  Respiratory: Positive for cough  Mild cough  Musculoskeletal: Negative for myalgias  Objective:      /88   Pulse 77   Temp 98 3 °F (36 8 °C)   Resp 18   Wt (!) 140 kg (309 lb 3 2 oz)   SpO2 96%   BMI 45 66 kg/m²          Physical Exam   Constitutional: He is oriented to person, place, and time  He appears well-developed and well-nourished  No distress  HENT:   Head: Normocephalic and atraumatic  Right Ear: Hearing, tympanic membrane, external ear and ear canal normal    Left Ear: Hearing, tympanic membrane, external ear and ear canal normal    Nose: Mucosal edema present  Mouth/Throat: Uvula is midline, oropharynx is clear and moist and mucous membranes are normal  No oropharyngeal exudate, posterior oropharyngeal edema or posterior oropharyngeal erythema  Eyes: Conjunctivae are normal    Neck: Normal range of motion  Neck supple  No thyromegaly present  Cardiovascular: Normal rate, regular rhythm and normal heart sounds  Exam reveals no gallop and no friction rub  No murmur heard  Pulmonary/Chest: Effort normal and breath sounds normal  No stridor  No respiratory distress  He has no wheezes  He has no rales  Lymphadenopathy:     He has no cervical adenopathy  Neurological: He is alert and oriented to person, place, and time  Skin: He is not diaphoretic  Psychiatric: He has a normal mood and affect  His behavior is normal  Judgment and thought content normal    Nursing note and vitals reviewed

## 2020-02-23 DIAGNOSIS — E03.8 HYPOTHYROIDISM DUE TO HASHIMOTO'S THYROIDITIS: Primary | ICD-10-CM

## 2020-02-23 DIAGNOSIS — E06.3 HYPOTHYROIDISM DUE TO HASHIMOTO'S THYROIDITIS: Primary | ICD-10-CM

## 2020-02-23 RX ORDER — LEVOTHYROXINE SODIUM 0.05 MG/1
50 TABLET ORAL DAILY
Qty: 30 TABLET | Refills: 5 | Status: SHIPPED | OUTPATIENT
Start: 2020-02-23

## 2020-03-06 DIAGNOSIS — J45.20 MILD INTERMITTENT ASTHMA WITHOUT COMPLICATION: Chronic | ICD-10-CM

## 2020-03-06 RX ORDER — ALBUTEROL SULFATE 90 UG/1
AEROSOL, METERED RESPIRATORY (INHALATION)
Qty: 18 G | Refills: 0 | Status: SHIPPED | OUTPATIENT
Start: 2020-03-06 | End: 2020-08-03

## 2020-03-16 DIAGNOSIS — L30.9 ECZEMA, UNSPECIFIED TYPE: ICD-10-CM

## 2020-03-16 RX ORDER — CLOBETASOL PROPIONATE 0.5 MG/G
OINTMENT TOPICAL
Qty: 60 G | Refills: 0 | Status: SHIPPED | OUTPATIENT
Start: 2020-03-16 | End: 2020-03-19 | Stop reason: SDUPTHER

## 2020-03-19 ENCOUNTER — OFFICE VISIT (OUTPATIENT)
Dept: FAMILY MEDICINE CLINIC | Facility: CLINIC | Age: 65
End: 2020-03-19
Payer: COMMERCIAL

## 2020-03-19 VITALS
RESPIRATION RATE: 18 BRPM | HEART RATE: 78 BPM | BODY MASS INDEX: 44.28 KG/M2 | HEIGHT: 69 IN | TEMPERATURE: 97.6 F | SYSTOLIC BLOOD PRESSURE: 138 MMHG | WEIGHT: 299 LBS | OXYGEN SATURATION: 96 % | DIASTOLIC BLOOD PRESSURE: 88 MMHG

## 2020-03-19 DIAGNOSIS — L30.9 ECZEMA, UNSPECIFIED TYPE: ICD-10-CM

## 2020-03-19 DIAGNOSIS — L03.116 CELLULITIS OF LEFT LOWER EXTREMITY: Primary | ICD-10-CM

## 2020-03-19 PROBLEM — E03.4 HYPOTHYROIDISM DUE TO ACQUIRED ATROPHY OF THYROID: Status: ACTIVE | Noted: 2019-03-29

## 2020-03-19 PROBLEM — E78.2 MIXED HYPERLIPIDEMIA: Status: ACTIVE | Noted: 2019-03-29

## 2020-03-19 PROCEDURE — 3079F DIAST BP 80-89 MM HG: CPT | Performed by: NURSE PRACTITIONER

## 2020-03-19 PROCEDURE — 3075F SYST BP GE 130 - 139MM HG: CPT | Performed by: NURSE PRACTITIONER

## 2020-03-19 PROCEDURE — 1036F TOBACCO NON-USER: CPT | Performed by: NURSE PRACTITIONER

## 2020-03-19 PROCEDURE — 99213 OFFICE O/P EST LOW 20 MIN: CPT | Performed by: NURSE PRACTITIONER

## 2020-03-19 RX ORDER — CLOBETASOL PROPIONATE 0.5 MG/G
1 OINTMENT TOPICAL DAILY
Qty: 60 G | Refills: 1 | Status: SHIPPED | OUTPATIENT
Start: 2020-03-19 | End: 2020-11-19 | Stop reason: SDUPTHER

## 2020-03-19 RX ORDER — CEPHALEXIN 500 MG/1
500 CAPSULE ORAL EVERY 8 HOURS SCHEDULED
Qty: 30 CAPSULE | Refills: 0 | Status: SHIPPED | OUTPATIENT
Start: 2020-03-19 | End: 2020-03-29

## 2020-03-19 NOTE — PROGRESS NOTES
Assessment/Plan:           Problem List Items Addressed This Visit     None      Visit Diagnoses     Cellulitis of left lower extremity    -  Primary    Relevant Medications    cephalexin (KEFLEX) 500 mg capsule    Eczema, unspecified type        Relevant Medications    clobetasol (TEMOVATE) 0 05 % ointment            Subjective:      Patient ID: Melanie Pantoja  is a 60 y o  male  Here for c/o painful wound on anterior shin  Started one week  No drainage  Some redness noted  No fever or chills    C/o rash on shins/legs, using clobetasol and needs refill        The following portions of the patient's history were reviewed and updated as appropriate: allergies, current medications, past family history, past medical history, past social history, past surgical history and problem list     Review of Systems   Constitutional: Negative for fatigue and fever  Respiratory: Negative for cough and shortness of breath  Cardiovascular: Negative for chest pain and palpitations  Musculoskeletal: Negative for back pain and myalgias  Skin: Positive for wound  Neurological: Negative for dizziness, light-headedness and headaches  Hematological: Negative for adenopathy           Objective:    /88 (BP Location: Left arm, Patient Position: Sitting, Cuff Size: Large)   Pulse 78   Temp 97 6 °F (36 4 °C) (Tympanic)   Resp 18   Ht 5' 9" (1 753 m)   Wt 136 kg (299 lb)   SpO2 96%   BMI 44 15 kg/m²   Family History   Problem Relation Age of Onset    Diabetes Mother     Hypertension Mother     Colon cancer Father     Diabetes Father     Hypertension Father     Diabetes Sister     Diabetes Brother      Past Medical History:   Diagnosis Date    Asthma     Diabetes ([facility] [address] [facility]     Hypertension     Hypothyroidism      Social History     Socioeconomic History    Marital status: Unknown     Spouse name: Not on file    Number of children: Not on file    Years of education: Not on file    Highest education level: Not on file   Occupational History    Not on file   Social Needs    Financial resource strain: Not on file    Food insecurity:     Worry: Not on file     Inability: Not on file    Transportation needs:     Medical: Not on file     Non-medical: Not on file   Tobacco Use    Smoking status: Former Smoker     Last attempt to quit:      Years since quittin 2    Smokeless tobacco: Former User    Tobacco comment: former smoker per allscript     Substance and Sexual Activity    Alcohol use: No     Comment: social drinker per allscript     Drug use: Never    Sexual activity: Not on file   Lifestyle    Physical activity:     Days per week: Not on file     Minutes per session: Not on file    Stress: Not on file   Relationships    Social connections:     Talks on phone: Not on file     Gets together: Not on file     Attends Sikhism service: Not on file     Active member of club or organization: Not on file     Attends meetings of clubs or organizations: Not on file     Relationship status: Not on file    Intimate partner violence:     Fear of current or ex partner: Not on file     Emotionally abused: Not on file     Physically abused: Not on file     Forced sexual activity: Not on file   Other Topics Concern    Not on file   Social History Narrative    Not on file       Current Outpatient Medications:     ADMELOG SOLOSTAR 100 units/mL injection pen, Inject 20 Units under the skin 3 (three) times a day with meals , Disp: , Rfl: 1    albuterol (2 5 mg/3 mL) 0 083 % nebulizer solution, Take 1 vial (2 5 mg total) by nebulization every 6 (six) hours as needed for wheezing or shortness of breath, Disp: 30 vial, Rfl: 5    albuterol (PROVENTIL HFA,VENTOLIN HFA) 90 mcg/act inhaler, INHALE 2 PUFFS BY MOUTH EVERY 6 HOURS AS NEEDED FOR WHEEZING, Disp: 18 g, Rfl: 0    aspirin (ECOTRIN LOW STRENGTH) 81 mg EC tablet, Take 81 mg by mouth, Disp: , Rfl:     BD PEN NEEDLE FRANCISCO U/F 32G X 4 MM MISC, USE QID, Disp: , Rfl: 1    Blood Glucose Monitoring Suppl (ACCU-CHEK YADY PLUS) w/Device KIT, by Does not apply route, Disp: , Rfl:     clobetasol (TEMOVATE) 0 05 % ointment, Apply 1 application topically daily To affected area, Disp: 60 g, Rfl: 1    Dulaglutide (Trulicity) 3 79 PI/5 9NG SOPN, Inject 0 75 mg under the skin Once a week, Disp: , Rfl:     glucose blood (ACCU-CHEK YADY PLUS) test strip, Use as instructed, Disp: 100 each, Rfl: 5    glucose blood test strip, For checking bg 2x daily  e11 65, Disp: , Rfl:     guaifenesin-codeine (GUAIFENESIN AC) 100-10 MG/5ML liquid, Take 5 mL by mouth 3 (three) times a day as needed for cough or congestion, Disp: 180 mL, Rfl: 0    ibuprofen (MOTRIN) 600 mg tablet, Take 600 mg by mouth every 8 (eight) hours, Disp: , Rfl:     Insulin Aspart FlexPen (NOVOLOG FLEXPEN) 100 UNIT/ML SOPN, Inject 20 units before breakfast and lunch, before dinner  40 units  Dx: E11 9, Disp: , Rfl:     insulin detemir (LEVEMIR FLEXTOUCH) 100 Units/mL injection pen, Inject 30 units in am and 30 units in pm , Disp: , Rfl:     levothyroxine 50 mcg tablet, Take 1 tablet (50 mcg total) by mouth daily, Disp: 30 tablet, Rfl: 5    losartan (COZAAR) 50 mg tablet, Take 50 mg by mouth, Disp: , Rfl:     NOVOLOG FLEXPEN 100 units/mL SOPN, , Disp: , Rfl:     TRESIBA FLEXTOUCH 200 units/mL CONCENTRATED U-200 injection pen, 60 Units , Disp: , Rfl: 6    cephalexin (KEFLEX) 500 mg capsule, Take 1 capsule (500 mg total) by mouth every 8 (eight) hours for 10 days, Disp: 30 capsule, Rfl: 0  Allergies   Allergen Reactions    Dust Mite Extract Allergic Rhinitis              Physical Exam   Constitutional: He is oriented to person, place, and time  He appears well-developed and well-nourished  Eyes: Conjunctivae are normal    Neck: Normal range of motion  Neck supple  No thyromegaly present  Cardiovascular: Normal rate, regular rhythm and normal heart sounds     Pulmonary/Chest: Effort normal and breath sounds normal    Musculoskeletal: Normal range of motion  Neurological: He is alert and oriented to person, place, and time  Skin: Rash noted  1 cm lesion on anterior surface of shin with erythema noted     Psychiatric: He has a normal mood and affect  His speech is normal and behavior is normal  Judgment and thought content normal  Cognition and memory are normal    Nursing note and vitals reviewed  BMI Counseling: Body mass index is 44 15 kg/m²  The BMI is above normal  Nutrition recommendations include reducing portion sizes, decreasing overall calorie intake, 3-5 servings of fruits/vegetables daily, reducing fast food intake, consuming healthier snacks, decreasing soda and/or juice intake, moderation in carbohydrate intake, increasing intake of lean protein, reducing intake of saturated fat and trans fat and reducing intake of cholesterol  Exercise recommendations include moderate aerobic physical activity for 150 minutes/week, exercising 3-5 times per week and strength training exercises

## 2020-03-31 ENCOUNTER — TELEPHONE (OUTPATIENT)
Dept: FAMILY MEDICINE CLINIC | Facility: CLINIC | Age: 65
End: 2020-03-31

## 2020-03-31 NOTE — TELEPHONE ENCOUNTER
Please advise if you are willing to fill this, I don't see anything about this in recent office note

## 2020-03-31 NOTE — TELEPHONE ENCOUNTER
Patients wife called and stated that talked about Tatiana Lyons going on a matenance medication Symbicort and she wanted to know if that could be called in   Please advise      [facility] on [address]

## 2020-04-01 ENCOUNTER — TELEMEDICINE (OUTPATIENT)
Dept: FAMILY MEDICINE CLINIC | Facility: CLINIC | Age: 65
End: 2020-04-01
Payer: COMMERCIAL

## 2020-04-01 DIAGNOSIS — J45.30 MILD PERSISTENT ASTHMA WITHOUT COMPLICATION: Primary | ICD-10-CM

## 2020-04-01 PROCEDURE — G2012 BRIEF CHECK IN BY MD/QHP: HCPCS | Performed by: FAMILY MEDICINE

## 2020-04-01 RX ORDER — BUDESONIDE AND FORMOTEROL FUMARATE DIHYDRATE 80; 4.5 UG/1; UG/1
2 AEROSOL RESPIRATORY (INHALATION) 2 TIMES DAILY
Qty: 1 INHALER | Refills: 5 | Status: SHIPPED | OUTPATIENT
Start: 2020-04-01 | End: 2020-04-07 | Stop reason: SDUPTHER

## 2020-04-03 ENCOUNTER — DOCUMENTATION (OUTPATIENT)
Dept: FAMILY MEDICINE CLINIC | Facility: OTHER | Age: 65
End: 2020-04-03

## 2020-04-07 DIAGNOSIS — J45.30 MILD PERSISTENT ASTHMA WITHOUT COMPLICATION: ICD-10-CM

## 2020-04-07 RX ORDER — BUDESONIDE AND FORMOTEROL FUMARATE DIHYDRATE 80; 4.5 UG/1; UG/1
2 AEROSOL RESPIRATORY (INHALATION) 2 TIMES DAILY
Qty: 1 INHALER | Refills: 5 | Status: SHIPPED | OUTPATIENT
Start: 2020-04-07

## 2020-07-01 ENCOUNTER — OFFICE VISIT (OUTPATIENT)
Dept: FAMILY MEDICINE CLINIC | Facility: CLINIC | Age: 65
End: 2020-07-01
Payer: COMMERCIAL

## 2020-07-01 VITALS
SYSTOLIC BLOOD PRESSURE: 148 MMHG | HEART RATE: 77 BPM | WEIGHT: 307.4 LBS | BODY MASS INDEX: 45.4 KG/M2 | OXYGEN SATURATION: 98 % | RESPIRATION RATE: 12 BRPM | DIASTOLIC BLOOD PRESSURE: 92 MMHG

## 2020-07-01 DIAGNOSIS — N47.1 PHIMOSIS: Primary | ICD-10-CM

## 2020-07-01 PROCEDURE — 3080F DIAST BP >= 90 MM HG: CPT | Performed by: FAMILY MEDICINE

## 2020-07-01 PROCEDURE — 3077F SYST BP >= 140 MM HG: CPT | Performed by: FAMILY MEDICINE

## 2020-07-01 PROCEDURE — 99213 OFFICE O/P EST LOW 20 MIN: CPT | Performed by: FAMILY MEDICINE

## 2020-07-01 PROCEDURE — 1036F TOBACCO NON-USER: CPT | Performed by: FAMILY MEDICINE

## 2020-07-01 RX ORDER — NYSTATIN 100000 U/G
CREAM TOPICAL 2 TIMES DAILY
Qty: 30 G | Refills: 0 | Status: SHIPPED | OUTPATIENT
Start: 2020-07-01 | End: 2020-07-24

## 2020-07-01 RX ORDER — AMOXICILLIN AND CLAVULANATE POTASSIUM 875; 125 MG/1; MG/1
1 TABLET, FILM COATED ORAL 2 TIMES DAILY WITH MEALS
Qty: 20 TABLET | Refills: 0 | Status: SHIPPED | OUTPATIENT
Start: 2020-07-01 | End: 2020-07-11

## 2020-07-01 NOTE — PROGRESS NOTES
Assessment/Plan:    No problem-specific Assessment & Plan notes found for this encounter  Diagnoses and all orders for this visit:    Phimosis  Comments:  Pt is stable  Treat topically with Nystatin cream and Mupirocin oint (pt already has); PO Augmentin (was on Amox by Derm - switch to Augmentin)  Precautions  Orders:  -     nystatin (MYCOSTATIN) cream; Apply topically 2 (two) times a day for 7 days  -     amoxicillin-clavulanate (AUGMENTIN) 875-125 mg per tablet; Take 1 tablet by mouth 2 (two) times a day with meals for 10 days          Subjective:      Patient ID: Jonathon Hairston Sr  is a 60 y o  male  Pt with six days of penile redness - he is uncircumcised, and when retracting to clean, it became reddened  No real pain or fevers  His last GFR on record was stable at 55        The following portions of the patient's history were reviewed and updated as appropriate: allergies, current medications, past family history, past social history, past surgical history and problem list     Past Medical History:   Diagnosis Date    Asthma     Diabetes ([facility] [address] )     Hypertension     Hypothyroidism        Current Outpatient Medications:     ADMELOG SOLOSTAR 100 units/mL injection pen, Inject 20 Units under the skin 3 (three) times a day with meals , Disp: , Rfl: 1    albuterol (2 5 mg/3 mL) 0 083 % nebulizer solution, Take 1 vial (2 5 mg total) by nebulization every 6 (six) hours as needed for wheezing or shortness of breath, Disp: 30 vial, Rfl: 5    albuterol (PROVENTIL HFA,VENTOLIN HFA) 90 mcg/act inhaler, INHALE 2 PUFFS BY MOUTH EVERY 6 HOURS AS NEEDED FOR WHEEZING, Disp: 18 g, Rfl: 0    amoxicillin-clavulanate (AUGMENTIN) 875-125 mg per tablet, Take 1 tablet by mouth 2 (two) times a day with meals for 10 days, Disp: 20 tablet, Rfl: 0    aspirin (ECOTRIN LOW STRENGTH) 81 mg EC tablet, Take 81 mg by mouth, Disp: , Rfl:     BD PEN NEEDLE FRANCISCO U/F 32G X 4 MM MISC, USE QID, Disp: , Rfl: 1    Blood Glucose Monitoring Suppl (ACCU-CHEK YADY PLUS) w/Device KIT, by Does not apply route, Disp: , Rfl:     budesonide-formoterol (SYMBICORT) 80-4 5 MCG/ACT inhaler, Inhale 2 puffs 2 (two) times a day Rinse mouth after use , Disp: 1 Inhaler, Rfl: 5    clobetasol (TEMOVATE) 0 05 % ointment, Apply 1 application topically daily To affected area, Disp: 60 g, Rfl: 1    Dulaglutide (Trulicity) 0 45 KI/0 5UD SOPN, Inject 0 75 mg under the skin Once a week, Disp: , Rfl:     glucose blood (ACCU-CHEK YADY PLUS) test strip, Use as instructed, Disp: 100 each, Rfl: 5    glucose blood test strip, For checking bg 2x daily  e11 65, Disp: , Rfl:     guaifenesin-codeine (GUAIFENESIN AC) 100-10 MG/5ML liquid, Take 5 mL by mouth 3 (three) times a day as needed for cough or congestion, Disp: 180 mL, Rfl: 0    ibuprofen (MOTRIN) 600 mg tablet, Take 600 mg by mouth every 8 (eight) hours, Disp: , Rfl:     Insulin Aspart FlexPen (NOVOLOG FLEXPEN) 100 UNIT/ML SOPN, Inject 20 units before breakfast and lunch, before dinner  40 units    Dx: E11 9, Disp: , Rfl:     insulin detemir (LEVEMIR FLEXTOUCH) 100 Units/mL injection pen, Inject 30 units in am and 30 units in pm , Disp: , Rfl:     levothyroxine 50 mcg tablet, Take 1 tablet (50 mcg total) by mouth daily, Disp: 30 tablet, Rfl: 5    losartan (COZAAR) 50 mg tablet, Take 50 mg by mouth, Disp: , Rfl:     NOVOLOG FLEXPEN 100 units/mL SOPN, , Disp: , Rfl:     nystatin (MYCOSTATIN) cream, Apply topically 2 (two) times a day for 7 days, Disp: 30 g, Rfl: 0    TRESIBA FLEXTOUCH 200 units/mL CONCENTRATED U-200 injection pen, 60 Units , Disp: , Rfl: 6    Allergies   Allergen Reactions    Dust Mite Extract Allergic Rhinitis     Social History     Tobacco Use    Smoking status: Former Smoker     Last attempt to quit:      Years since quittin 5    Smokeless tobacco: Former User    Tobacco comment: former smoker per allscript     Substance Use Topics    Alcohol use: No     Comment: social drinker per allscript     Drug use: Never         Review of Systems   Constitutional: Negative for activity change and fever  Genitourinary: Negative for dysuria  Penile redness  Objective:      /92   Pulse 77   Resp 12   Wt (!) 139 kg (307 lb 6 4 oz)   SpO2 98%   BMI 45 40 kg/m²          Physical Exam   Constitutional: He is oriented to person, place, and time  He appears well-developed and well-nourished  No distress  HENT:   Head: Normocephalic and atraumatic  Genitourinary: Uncircumcised  Phimosis present  Neurological: He is alert and oriented to person, place, and time  Skin: He is not diaphoretic  Psychiatric: He has a normal mood and affect  His behavior is normal  Judgment and thought content normal    Nursing note and vitals reviewed

## 2020-07-14 ENCOUNTER — OFFICE VISIT (OUTPATIENT)
Dept: FAMILY MEDICINE CLINIC | Facility: CLINIC | Age: 65
End: 2020-07-14
Payer: COMMERCIAL

## 2020-07-14 VITALS
SYSTOLIC BLOOD PRESSURE: 136 MMHG | HEIGHT: 69 IN | DIASTOLIC BLOOD PRESSURE: 88 MMHG | HEART RATE: 96 BPM | RESPIRATION RATE: 16 BRPM | BODY MASS INDEX: 45.83 KG/M2 | WEIGHT: 309.4 LBS | TEMPERATURE: 98.9 F | OXYGEN SATURATION: 97 %

## 2020-07-14 DIAGNOSIS — N47.1 PHIMOSIS: Primary | ICD-10-CM

## 2020-07-14 PROCEDURE — 99213 OFFICE O/P EST LOW 20 MIN: CPT | Performed by: FAMILY MEDICINE

## 2020-07-14 PROCEDURE — 3008F BODY MASS INDEX DOCD: CPT | Performed by: FAMILY MEDICINE

## 2020-07-14 PROCEDURE — 1036F TOBACCO NON-USER: CPT | Performed by: FAMILY MEDICINE

## 2020-07-14 PROCEDURE — 3075F SYST BP GE 130 - 139MM HG: CPT | Performed by: FAMILY MEDICINE

## 2020-07-14 PROCEDURE — 3079F DIAST BP 80-89 MM HG: CPT | Performed by: FAMILY MEDICINE

## 2020-07-14 RX ORDER — KETOCONAZOLE 20 MG/G
CREAM TOPICAL 2 TIMES DAILY
Qty: 30 G | Refills: 1 | Status: SHIPPED | OUTPATIENT
Start: 2020-07-14

## 2020-07-14 RX ORDER — AMOXICILLIN AND CLAVULANATE POTASSIUM 875; 125 MG/1; MG/1
1 TABLET, FILM COATED ORAL 2 TIMES DAILY WITH MEALS
Qty: 20 TABLET | Refills: 0 | Status: SHIPPED | OUTPATIENT
Start: 2020-07-14 | End: 2020-07-24

## 2020-07-14 NOTE — PROGRESS NOTES
Assessment/Plan:    No problem-specific Assessment & Plan notes found for this encounter  Diagnoses and all orders for this visit:    Phimosis  Comments:  Jesus is stable on exam  Overall, appears better somewhat from last OV  Tx with Mupirocin (has) & Ketoconazole topically; PO Augmentin; probiotic  Precautions  Orders:  -     ketoconazole (NIZORAL) 2 % cream; Apply topically 2 (two) times a day  -     amoxicillin-clavulanate (AUGMENTIN) 875-125 mg per tablet; Take 1 tablet by mouth 2 (two) times a day with meals for 10 days          Subjective:      Patient ID: Cher Johnson Sr  is a 60 y o  male  Kal Sarkar was here on 7/1/20 for phimosis - treated topically with Ketoconazole Cream and Mupirocin, with Keflex PO>  Not much change as per the pt  No fevers  From hx - pt is uncircumcised, and when retracting to clean, it became reddened initially  His last GFR on record was stable at 55        The following portions of the patient's history were reviewed and updated as appropriate: allergies, current medications, past family history, past social history, past surgical history and problem list     Past Medical History:   Diagnosis Date    Asthma     Diabetes ([facility] [address]     Hypertension     Hypothyroidism        Current Outpatient Medications:     ADMELOG SOLOSTAR 100 units/mL injection pen, Inject 20 Units under the skin 3 (three) times a day with meals , Disp: , Rfl: 1    albuterol (2 5 mg/3 mL) 0 083 % nebulizer solution, Take 1 vial (2 5 mg total) by nebulization every 6 (six) hours as needed for wheezing or shortness of breath, Disp: 30 vial, Rfl: 5    albuterol (PROVENTIL HFA,VENTOLIN HFA) 90 mcg/act inhaler, INHALE 2 PUFFS BY MOUTH EVERY 6 HOURS AS NEEDED FOR WHEEZING, Disp: 18 g, Rfl: 0    amoxicillin-clavulanate (AUGMENTIN) 875-125 mg per tablet, Take 1 tablet by mouth 2 (two) times a day with meals for 10 days, Disp: 20 tablet, Rfl: 0    aspirin (ECOTRIN LOW STRENGTH) 81 mg EC tablet, Take 81 mg by mouth, Disp: , Rfl:     BD PEN NEEDLE FRANCICSO U/F 32G X 4 MM MISC, USE QID, Disp: , Rfl: 1    Blood Glucose Monitoring Suppl (ACCU-CHEK YADY PLUS) w/Device KIT, by Does not apply route, Disp: , Rfl:     budesonide-formoterol (SYMBICORT) 80-4 5 MCG/ACT inhaler, Inhale 2 puffs 2 (two) times a day Rinse mouth after use , Disp: 1 Inhaler, Rfl: 5    clobetasol (TEMOVATE) 0 05 % ointment, Apply 1 application topically daily To affected area, Disp: 60 g, Rfl: 1    Dulaglutide (Trulicity) 7 23 DK/3 1ZE SOPN, Inject 0 75 mg under the skin Once a week, Disp: , Rfl:     glucose blood (ACCU-CHEK YADY PLUS) test strip, Use as instructed, Disp: 100 each, Rfl: 5    glucose blood test strip, For checking bg 2x daily  e11 65, Disp: , Rfl:     guaifenesin-codeine (GUAIFENESIN AC) 100-10 MG/5ML liquid, Take 5 mL by mouth 3 (three) times a day as needed for cough or congestion, Disp: 180 mL, Rfl: 0    ibuprofen (MOTRIN) 600 mg tablet, Take 600 mg by mouth every 8 (eight) hours, Disp: , Rfl:     Insulin Aspart FlexPen (NOVOLOG FLEXPEN) 100 UNIT/ML SOPN, Inject 20 units before breakfast and lunch, before dinner  40 units    Dx: E11 9, Disp: , Rfl:     insulin detemir (LEVEMIR FLEXTOUCH) 100 Units/mL injection pen, Inject 30 units in am and 30 units in pm , Disp: , Rfl:     ketoconazole (NIZORAL) 2 % cream, Apply topically 2 (two) times a day, Disp: 30 g, Rfl: 1    levothyroxine 50 mcg tablet, Take 1 tablet (50 mcg total) by mouth daily, Disp: 30 tablet, Rfl: 5    losartan (COZAAR) 50 mg tablet, Take 50 mg by mouth, Disp: , Rfl:     NOVOLOG FLEXPEN 100 units/mL SOPN, , Disp: , Rfl:     nystatin (MYCOSTATIN) cream, Apply topically 2 (two) times a day for 7 days, Disp: 30 g, Rfl: 0    TRESIBA FLEXTOUCH 200 units/mL CONCENTRATED U-200 injection pen, 60 Units , Disp: , Rfl: 6    Allergies   Allergen Reactions    Dust Mite Extract Allergic Rhinitis     Social History     Tobacco Use    Smoking status: Former Smoker     Last attempt to quit:      Years since quittin 5    Smokeless tobacco: Former User    Tobacco comment: former smoker per allscript     Substance Use Topics    Alcohol use: No     Comment: social drinker per allscript     Drug use: Never         Review of Systems   Constitutional: Negative for activity change and fever  Gastrointestinal: Negative for abdominal pain  Genitourinary: Negative for dysuria  Pain only, with urine touches raw areas on penis  Skin: Positive for rash  Objective:      /88   Pulse 96   Temp 98 9 °F (37 2 °C)   Resp 16   Ht 5' 9" (1 753 m)   Wt (!) 140 kg (309 lb 6 4 oz)   SpO2 97%   BMI 45 69 kg/m²          Physical Exam   Constitutional: He is oriented to person, place, and time  He appears well-developed and well-nourished  No distress  HENT:   Head: Normocephalic and atraumatic  Eyes: Conjunctivae are normal  No scleral icterus  Genitourinary:         Neurological: He is alert and oriented to person, place, and time  Skin: He is not diaphoretic  Psychiatric: He has a normal mood and affect  His behavior is normal  Judgment and thought content normal    Nursing note and vitals reviewed

## 2020-07-23 ENCOUNTER — TELEPHONE (OUTPATIENT)
Dept: FAMILY MEDICINE CLINIC | Facility: CLINIC | Age: 65
End: 2020-07-23

## 2020-07-23 ENCOUNTER — TELEPHONE (OUTPATIENT)
Dept: UROLOGY | Facility: MEDICAL CENTER | Age: 65
End: 2020-07-23

## 2020-07-23 DIAGNOSIS — N47.1 PHIMOSIS OF PENIS: Primary | ICD-10-CM

## 2020-07-23 NOTE — TELEPHONE ENCOUNTER
Complaints/diagnosis new pt and pt has infection on penis and he is diabetic and not feeling well and primary dr said he needs go urologist  Insurance:"[employer]  History of Cancer:no   Previous Urologist: no   Outside testing/where: no   what kind of test: no   Records requested/where: [facility]   [facility]

## 2020-07-23 NOTE — TELEPHONE ENCOUNTER
Call returned to wife, left message advising we are holding appointment at [facility] office for 7/24 8:15 am  Wife to call back to confirm  Office number provided on voicemail

## 2020-07-23 NOTE — TELEPHONE ENCOUNTER
Patients wife called and said her  has infection on his penis and the primary doctor was taking care of him and said it is not getting better to call us  Patient is diabetic and not feeling well and they are concerned and said they would drive to any office  Please call her back at [phone]

## 2020-07-24 ENCOUNTER — OFFICE VISIT (OUTPATIENT)
Dept: UROLOGY | Facility: MEDICAL CENTER | Age: 65
End: 2020-07-24
Payer: COMMERCIAL

## 2020-07-24 ENCOUNTER — TELEPHONE (OUTPATIENT)
Dept: UROLOGY | Facility: MEDICAL CENTER | Age: 65
End: 2020-07-24

## 2020-07-24 VITALS
HEART RATE: 90 BPM | DIASTOLIC BLOOD PRESSURE: 80 MMHG | HEIGHT: 69 IN | SYSTOLIC BLOOD PRESSURE: 146 MMHG | WEIGHT: 307 LBS | BODY MASS INDEX: 45.47 KG/M2

## 2020-07-24 DIAGNOSIS — N47.1 PHIMOSIS OF PENIS: ICD-10-CM

## 2020-07-24 DIAGNOSIS — Z12.5 PROSTATE CANCER SCREENING: ICD-10-CM

## 2020-07-24 DIAGNOSIS — N48.1 BALANITIS: Primary | ICD-10-CM

## 2020-07-24 PROCEDURE — 3079F DIAST BP 80-89 MM HG: CPT | Performed by: UROLOGY

## 2020-07-24 PROCEDURE — 3008F BODY MASS INDEX DOCD: CPT | Performed by: UROLOGY

## 2020-07-24 PROCEDURE — 99203 OFFICE O/P NEW LOW 30 MIN: CPT | Performed by: UROLOGY

## 2020-07-24 PROCEDURE — 1036F TOBACCO NON-USER: CPT | Performed by: UROLOGY

## 2020-07-24 PROCEDURE — 3077F SYST BP >= 140 MM HG: CPT | Performed by: UROLOGY

## 2020-07-24 RX ORDER — NYSTATIN AND TRIAMCINOLONE ACETONIDE 100000; 1 [USP'U]/G; MG/G
OINTMENT TOPICAL 2 TIMES DAILY
Qty: 30 G | Refills: 0 | Status: SHIPPED | OUTPATIENT
Start: 2020-07-24

## 2020-07-24 NOTE — TELEPHONE ENCOUNTER
Pt showed up late for 8:15 am appt with Natalie Peterson at the [facility]  Stated they got lost   Asked if they could be seen at a later time today  Pt complaining of swollen and red penis  He is diabetic with A1C of 11  Advised pt to come to [place] office right away to be seen by Dr Snehal Clark

## 2020-07-24 NOTE — TELEPHONE ENCOUNTER
Patient was prescribed medication  nystatin-triamcinolone  Call was received from his wife that this needs prior auth    Please call at [phone] to advise  Thank you

## 2020-07-24 NOTE — PROGRESS NOTES
[address] [facility]  [facility]  [address] [facility]  [place], [address] [place] [address]  [phone]  www  [facility]  org      NAME: Marino Mendez Sr   AGE: 60 y o  SEX: male  : 1955   MRN: [id number]    DATE: 2020  TIME: 9:22 AM    Assessment and Plan:      Phimosis, with fungal balanitis  Hemoglobin A1c is 11 which needs to come down  In any event, he can continue with the Augmentin as it appears any bacterial infection has been cleared  We will take care of the fungal infection with Mycolog cream applied twice a day and he will reduce the foreskin twice daily and use soap and water  Follow-up 3 months with a PSA and will do a prostate exam at that time  And reassess things  Chief Complaint   No chief complaint on file  History of Present Illness   New patient office visit:  His wife called and said he had infection his penis, his primary care doctor was taking care of it was not getting better  He is diabetic  Hemoglobin A1c was 11 2020, and he has mild renal insufficiency with a creatinine 1 36 as of that time  The problem has been present for months and have  His previous urologic visit was a couple years ago with Dr Erica Jaimes  Last PSA I have him in was 0 3 February 2015  The following portions of the patient's history were reviewed and updated as appropriate: allergies, current medications, past family history, past medical history, past social history, past surgical history and problem list   Past Medical History:   Diagnosis Date    Asthma     Diabetes ([address] )     Hypertension     Hypothyroidism      Past Surgical History:   Procedure Laterality Date    CATARACT EXTRACTION, BILATERAL      COLONOSCOPY      MULTIPLE TOOTH EXTRACTIONS       shoulder  Review of Systems   Review of Systems   Constitutional: Negative for fever  Respiratory: Negative  Cardiovascular: Negative      Genitourinary: Positive for penile pain  Negative for difficulty urinating  Active Problem List     Patient Active Problem List   Diagnosis    Diabetes mellitus type 2, insulin dependent ([facility] [address] )    Morbid obesity with body mass index of 40 0-44 9 in adult [facility])    Benign essential hypertension    Hypertension    Hypothyroidism due to acquired atrophy of thyroid    Low vitamin D level    Mitral valve disorder    Morbid obesity (HCC)    Mixed hyperlipidemia    Neurodermatitis    Organic impotence       Objective   /80   Pulse 90   Ht 5' 9" (1 753 m)   Wt (!) 139 kg (307 lb)   BMI 45 34 kg/m²     Physical Exam   Constitutional: He is oriented to person, place, and time  He appears well-developed and well-nourished  HENT:   Head: Normocephalic and atraumatic  Eyes: EOM are normal    Neck: Normal range of motion  Pulmonary/Chest: Effort normal    Abdominal: Soft  He exhibits no distension and no mass  There is no tenderness  There is no rebound and no guarding  Genitourinary:   Genitourinary Comments: Uncircumcised phallus with phimosis that I was able to reduce completely  Mild erythema with fungal eruption and mixture cream on the glans penis, no lesions  No sign of cellulitis or other sign of infection  Normal testicles, descended bilaterally no skin lesions  Musculoskeletal: Normal range of motion  Neurological: He is alert and oriented to person, place, and time  Psychiatric: He has a normal mood and affect   His behavior is normal  Judgment and thought content normal            Current Medications     Current Outpatient Medications:     ADMELOG SOLOSTAR 100 units/mL injection pen, Inject 20 Units under the skin 3 (three) times a day with meals , Disp: , Rfl: 1    albuterol (2 5 mg/3 mL) 0 083 % nebulizer solution, Take 1 vial (2 5 mg total) by nebulization every 6 (six) hours as needed for wheezing or shortness of breath, Disp: 30 vial, Rfl: 5    albuterol (PROVENTIL HFA,VENTOLIN HFA) 90 mcg/act inhaler, INHALE 2 PUFFS BY MOUTH EVERY 6 HOURS AS NEEDED FOR WHEEZING, Disp: 18 g, Rfl: 0    amoxicillin-clavulanate (AUGMENTIN) 875-125 mg per tablet, Take 1 tablet by mouth 2 (two) times a day with meals for 10 days, Disp: 20 tablet, Rfl: 0    aspirin (ECOTRIN LOW STRENGTH) 81 mg EC tablet, Take 81 mg by mouth, Disp: , Rfl:     BD PEN NEEDLE FRANCISCO U/F 32G X 4 MM MISC, USE QID, Disp: , Rfl: 1    Blood Glucose Monitoring Suppl (ACCU-CHEK YADY PLUS) w/Device KIT, by Does not apply route, Disp: , Rfl:     budesonide-formoterol (SYMBICORT) 80-4 5 MCG/ACT inhaler, Inhale 2 puffs 2 (two) times a day Rinse mouth after use , Disp: 1 Inhaler, Rfl: 5    clobetasol (TEMOVATE) 0 05 % ointment, Apply 1 application topically daily To affected area, Disp: 60 g, Rfl: 1    Dulaglutide (Trulicity) 7 55 AQ/4 4ZJ SOPN, Inject 0 75 mg under the skin Once a week, Disp: , Rfl:     glucose blood (ACCU-CHEK YADY PLUS) test strip, Use as instructed, Disp: 100 each, Rfl: 5    glucose blood test strip, For checking bg 2x daily  e11 65, Disp: , Rfl:     guaifenesin-codeine (GUAIFENESIN AC) 100-10 MG/5ML liquid, Take 5 mL by mouth 3 (three) times a day as needed for cough or congestion, Disp: 180 mL, Rfl: 0    ibuprofen (MOTRIN) 600 mg tablet, Take 600 mg by mouth every 8 (eight) hours, Disp: , Rfl:     Insulin Aspart FlexPen (NOVOLOG FLEXPEN) 100 UNIT/ML SOPN, Inject 20 units before breakfast and lunch, before dinner  40 units    Dx: E11 9, Disp: , Rfl:     insulin detemir (LEVEMIR FLEXTOUCH) 100 Units/mL injection pen, Inject 30 units in am and 30 units in pm , Disp: , Rfl:     ketoconazole (NIZORAL) 2 % cream, Apply topically 2 (two) times a day, Disp: 30 g, Rfl: 1    levothyroxine 50 mcg tablet, Take 1 tablet (50 mcg total) by mouth daily, Disp: 30 tablet, Rfl: 5    losartan (COZAAR) 50 mg tablet, Take 50 mg by mouth, Disp: , Rfl:     NOVOLOG FLEXPEN 100 units/mL SOPN, , Disp: , Rfl:     nystatin (MYCOSTATIN) cream, Apply topically 2 (two) times a day for 7 days, Disp: 30 g, Rfl: 0    TRESIBA FLEXTOUCH 200 units/mL CONCENTRATED U-200 injection pen, 60 Units , Disp: , Rfl: 6        Ashley Oconnor MD

## 2020-07-27 NOTE — TELEPHONE ENCOUNTER
I spoke with the pharmacist  They are splitting the combination drug into two separate medication and processing accordingly  No further action required

## 2020-08-03 DIAGNOSIS — J45.20 MILD INTERMITTENT ASTHMA WITHOUT COMPLICATION: Chronic | ICD-10-CM

## 2020-08-03 RX ORDER — ALBUTEROL SULFATE 90 UG/1
AEROSOL, METERED RESPIRATORY (INHALATION)
Qty: 18 G | Refills: 5 | Status: SHIPPED | OUTPATIENT
Start: 2020-08-03 | End: 2020-10-08

## 2020-08-03 NOTE — TELEPHONE ENCOUNTER
Requested medication(s) are due for refill today: Yes  Patient has already received a courtesy refill: No  Other reason request has been forwarded to provider:    Per protocol

## 2020-10-08 DIAGNOSIS — J45.20 MILD INTERMITTENT ASTHMA WITHOUT COMPLICATION: Chronic | ICD-10-CM

## 2020-10-08 RX ORDER — ALBUTEROL SULFATE 90 UG/1
AEROSOL, METERED RESPIRATORY (INHALATION)
Qty: 18 G | Refills: 5 | Status: SHIPPED | OUTPATIENT
Start: 2020-10-08

## 2020-11-19 ENCOUNTER — OFFICE VISIT (OUTPATIENT)
Dept: DERMATOLOGY | Facility: CLINIC | Age: 65
End: 2020-11-19
Payer: COMMERCIAL

## 2020-11-19 VITALS — TEMPERATURE: 97.5 F | WEIGHT: 307 LBS | HEIGHT: 69 IN | BODY MASS INDEX: 45.47 KG/M2

## 2020-11-19 DIAGNOSIS — L29.9 PRURITUS: ICD-10-CM

## 2020-11-19 DIAGNOSIS — R21 RASH: ICD-10-CM

## 2020-11-19 DIAGNOSIS — L28.0 LICHEN SIMPLEX CHRONICUS: ICD-10-CM

## 2020-11-19 PROCEDURE — 99204 OFFICE O/P NEW MOD 45 MIN: CPT | Performed by: DERMATOLOGY

## 2020-11-19 PROCEDURE — 11901 INJECT SKIN LESIONS >7: CPT | Performed by: DERMATOLOGY

## 2020-11-19 RX ORDER — CLOBETASOL PROPIONATE 0.5 MG/G
OINTMENT TOPICAL
Qty: 60 G | Refills: 2 | Status: SHIPPED | OUTPATIENT
Start: 2020-11-19

## 2020-11-23 ENCOUNTER — TELEPHONE (OUTPATIENT)
Dept: DERMATOLOGY | Facility: CLINIC | Age: 65
End: 2020-11-23

## 2020-11-27 ENCOUNTER — TELEPHONE (OUTPATIENT)
Dept: FAMILY MEDICINE CLINIC | Facility: CLINIC | Age: 65
End: 2020-11-27

## 2020-12-01 ENCOUNTER — TELEPHONE (OUTPATIENT)
Dept: FAMILY MEDICINE CLINIC | Facility: CLINIC | Age: 65
End: 2020-12-01

## 2020-12-01 DIAGNOSIS — Z20.822 EXPOSURE TO COVID-19 VIRUS: Primary | ICD-10-CM

## 2020-12-07 ENCOUNTER — TELEPHONE (OUTPATIENT)
Dept: FAMILY MEDICINE CLINIC | Facility: CLINIC | Age: 65
End: 2020-12-07

## 2020-12-08 DIAGNOSIS — S83.242D ACUTE MEDIAL MENISCUS TEAR OF LEFT KNEE, SUBSEQUENT ENCOUNTER: Primary | ICD-10-CM

## 2020-12-09 DIAGNOSIS — J45.20 MILD INTERMITTENT ASTHMA WITHOUT COMPLICATION: ICD-10-CM

## 2020-12-09 RX ORDER — ALBUTEROL SULFATE 2.5 MG/3ML
2.5 SOLUTION RESPIRATORY (INHALATION) EVERY 6 HOURS PRN
Qty: 360 VIAL | Refills: 3 | Status: SHIPPED | OUTPATIENT
Start: 2020-12-09

## 2020-12-11 ENCOUNTER — TELEPHONE (OUTPATIENT)
Dept: DERMATOLOGY | Facility: CLINIC | Age: 65
End: 2020-12-11

## 2020-12-18 ENCOUNTER — TELEPHONE (OUTPATIENT)
Dept: DERMATOLOGY | Facility: CLINIC | Age: 65
End: 2020-12-18